# Patient Record
Sex: FEMALE | Race: WHITE
[De-identification: names, ages, dates, MRNs, and addresses within clinical notes are randomized per-mention and may not be internally consistent; named-entity substitution may affect disease eponyms.]

---

## 2018-09-14 ENCOUNTER — HOSPITAL ENCOUNTER (INPATIENT)
Dept: HOSPITAL 63 - ER | Age: 48
LOS: 3 days | Discharge: NURSING, REHAB OR LONG-TERM CARE | DRG: 871 | End: 2018-09-17
Attending: FAMILY MEDICINE | Admitting: FAMILY MEDICINE
Payer: SELF-PAY

## 2018-09-14 VITALS — DIASTOLIC BLOOD PRESSURE: 79 MMHG | SYSTOLIC BLOOD PRESSURE: 161 MMHG

## 2018-09-14 VITALS — WEIGHT: 293 LBS | BODY MASS INDEX: 50.02 KG/M2 | HEIGHT: 64 IN

## 2018-09-14 DIAGNOSIS — I10: ICD-10-CM

## 2018-09-14 DIAGNOSIS — Z83.3: ICD-10-CM
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
## 2018-09-14 DIAGNOSIS — E66.01: ICD-10-CM

## 2018-09-14 DIAGNOSIS — F41.9: ICD-10-CM

## 2018-09-14 DIAGNOSIS — Z91.19: ICD-10-CM

## 2018-09-14 DIAGNOSIS — N39.0: ICD-10-CM

## 2018-09-14 DIAGNOSIS — Z79.899: ICD-10-CM

## 2018-09-14 DIAGNOSIS — G93.40: ICD-10-CM

## 2018-09-14 DIAGNOSIS — F32.9: ICD-10-CM
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
## 2018-09-14 DIAGNOSIS — E44.0: ICD-10-CM

## 2018-09-14 DIAGNOSIS — Z87.440: ICD-10-CM

## 2018-09-14 DIAGNOSIS — I26.99: ICD-10-CM

## 2018-09-14 DIAGNOSIS — Z86.711: ICD-10-CM
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
## 2018-09-14 DIAGNOSIS — M19.90: ICD-10-CM

## 2018-09-14 DIAGNOSIS — E87.1: ICD-10-CM

## 2018-09-14 DIAGNOSIS — E86.0: ICD-10-CM

## 2018-09-14 DIAGNOSIS — A41.9: Primary | ICD-10-CM

## 2018-09-14 DIAGNOSIS — E11.10: ICD-10-CM

## 2018-09-14 DIAGNOSIS — G47.33: ICD-10-CM

## 2018-09-14 DIAGNOSIS — E87.6: ICD-10-CM

## 2018-09-14 DIAGNOSIS — F05: ICD-10-CM

## 2018-09-14 DIAGNOSIS — D50.9: ICD-10-CM

## 2018-09-14 DIAGNOSIS — Z86.73: ICD-10-CM

## 2018-09-14 DIAGNOSIS — Z60.2: ICD-10-CM

## 2018-09-14 LAB
% BANDS: 7 % (ref 0–9)
% LYMPHS: 4 % (ref 24–48)
% METAS: 1 % (ref 0–0)
% MONOS: 2 % (ref 0–10)
% SEGS: 86 % (ref 35–66)
AMPHETAMINE/METHAMPHETAMINE: (no result)
AMYLASE SERPL-CCNC: 45 U/L (ref 25–115)
ANION GAP SERPL CALC-SCNC: 14 MMOL/L (ref 6–14)
ANISOCYTOSIS BLD QL SMEAR: SLIGHT
APTT PPP: YELLOW S
BACTERIA #/AREA URNS HPF: (no result) /HPF
BARBITURATES UR-MCNC: (no result) UG/ML
BASOPHILS # BLD AUTO: 0.1 X10^3/UL (ref 0–0.2)
BASOPHILS NFR BLD: 1 % (ref 0–3)
BENZODIAZ UR-MCNC: (no result) UG/L
BGAS PCO2: 39 MMHG (ref 35–45)
BGAS PH: 7.36 (ref 7.35–7.45)
BGAS PO2: 64 MMHG (ref 80–100)
BILIRUB UR QL STRIP: (no result)
CA-I SERPL ISE-MCNC: 30 MG/DL (ref 7–20)
CALCIUM SERPL-MCNC: 8.8 MG/DL (ref 8.5–10.1)
CANNABINOIDS UR-MCNC: (no result) UG/L
CHLORIDE SERPL-SCNC: 95 MMOL/L (ref 98–107)
CO2 SERPL-SCNC: 24 MMOL/L (ref 21–32)
COCAINE UR-MCNC: (no result) NG/ML
CREAT SERPL-MCNC: 2.2 MG/DL (ref 0.6–1)
DELTA BASE BGAS: -4 MMOL/L (ref 0–3)
EOSINOPHIL NFR BLD: 0 % (ref 0–3)
EOSINOPHIL NFR BLD: 0 X10^3/UL (ref 0–0.7)
ERYTHROCYTE [DISTWIDTH] IN BLOOD BY AUTOMATED COUNT: 17.6 % (ref 11.5–14.5)
FIBRINOGEN PPP-MCNC: (no result) MG/DL
GFR SERPLBLD BASED ON 1.73 SQ M-ARVRAT: 23.9 ML/MIN
GLUCOSE SERPL-MCNC: 573 MG/DL (ref 70–99)
GLUCOSE UR STRIP-MCNC: >=1000 MG/DL
HCT VFR BLD CALC: 38.6 % (ref 36–47)
HGB BLD-MCNC: 12.3 G/DL (ref 12–15.5)
HYALINE CASTS #/AREA URNS LPF: (no result) /HPF
HYPOCHROMIA BLD QL SMEAR: SLIGHT
LYMPHOCYTES # BLD: 0.9 X10^3/UL (ref 1–4.8)
LYMPHOCYTES NFR BLD AUTO: 4 % (ref 24–48)
MAGNESIUM SERPL-MCNC: 1.9 MG/DL (ref 1.8–2.4)
MCH RBC QN AUTO: 25 PG (ref 25–35)
MCHC RBC AUTO-ENTMCNC: 32 G/DL (ref 31–37)
MCV RBC AUTO: 77 FL (ref 79–100)
METHADONE SERPL-MCNC: (no result) NG/ML
MICROCYTES BLD QL SMEAR: SLIGHT
MONO #: 0.5 X10^3/UL (ref 0–1.1)
MONOCYTES NFR BLD: 3 % (ref 0–9)
NEUT #: 18.5 X10^3UL (ref 1.8–7.7)
NEUTROPHILS NFR BLD AUTO: 92 % (ref 31–73)
NITRITE UR QL STRIP: (no result)
O2 SAT BGAS: 91 % (ref 92–99)
O2/TOTAL GAS SETTING VFR VENT: 21 %
OPIATES UR-MCNC: (no result) NG/ML
PCP SERPL-MCNC: (no result) MG/DL
PLATELET # BLD AUTO: 441 X10^3/UL (ref 140–400)
PLATELET # BLD EST: (no result) 10*3/UL
POTASSIUM SERPL-SCNC: 4.5 MMOL/L (ref 3.5–5.1)
RBC # BLD AUTO: 5.02 X10^6/UL (ref 3.5–5.4)
RBC #/AREA URNS HPF: (no result) /HPF (ref 0–2)
SODIUM SERPL-SCNC: 133 MMOL/L (ref 136–145)
SP GR UR STRIP: 1.01
SQUAMOUS #/AREA URNS LPF: (no result) /LPF
TOXIC GRANULES BLD QL SMEAR: PRESENT
UROBILINOGEN UR-MCNC: 0.2 MG/DL
WBC # BLD AUTO: 20 X10^3/UL (ref 4–11)
WBC #/AREA URNS HPF: (no result) /HPF (ref 0–4)

## 2018-09-14 PROCEDURE — 93005 ELECTROCARDIOGRAM TRACING: CPT

## 2018-09-14 PROCEDURE — 82803 BLOOD GASES ANY COMBINATION: CPT

## 2018-09-14 PROCEDURE — 87040 BLOOD CULTURE FOR BACTERIA: CPT

## 2018-09-14 PROCEDURE — 87086 URINE CULTURE/COLONY COUNT: CPT

## 2018-09-14 PROCEDURE — 83036 HEMOGLOBIN GLYCOSYLATED A1C: CPT

## 2018-09-14 PROCEDURE — 83735 ASSAY OF MAGNESIUM: CPT

## 2018-09-14 PROCEDURE — 80048 BASIC METABOLIC PNL TOTAL CA: CPT

## 2018-09-14 PROCEDURE — 81001 URINALYSIS AUTO W/SCOPE: CPT

## 2018-09-14 PROCEDURE — 96376 TX/PRO/DX INJ SAME DRUG ADON: CPT

## 2018-09-14 PROCEDURE — 85610 PROTHROMBIN TIME: CPT

## 2018-09-14 PROCEDURE — 87205 SMEAR GRAM STAIN: CPT

## 2018-09-14 PROCEDURE — 83880 ASSAY OF NATRIURETIC PEPTIDE: CPT

## 2018-09-14 PROCEDURE — 71275 CT ANGIOGRAPHY CHEST: CPT

## 2018-09-14 PROCEDURE — 80053 COMPREHEN METABOLIC PANEL: CPT

## 2018-09-14 PROCEDURE — 96366 THER/PROPH/DIAG IV INF ADDON: CPT

## 2018-09-14 PROCEDURE — 96365 THER/PROPH/DIAG IV INF INIT: CPT

## 2018-09-14 PROCEDURE — 85379 FIBRIN DEGRADATION QUANT: CPT

## 2018-09-14 PROCEDURE — 71045 X-RAY EXAM CHEST 1 VIEW: CPT

## 2018-09-14 PROCEDURE — 80061 LIPID PANEL: CPT

## 2018-09-14 PROCEDURE — 85730 THROMBOPLASTIN TIME PARTIAL: CPT

## 2018-09-14 PROCEDURE — 36600 WITHDRAWAL OF ARTERIAL BLOOD: CPT

## 2018-09-14 PROCEDURE — 76700 US EXAM ABDOM COMPLETE: CPT

## 2018-09-14 PROCEDURE — 73600 X-RAY EXAM OF ANKLE: CPT

## 2018-09-14 PROCEDURE — G0479 DRUG TEST PRESUMP NOT OPT: HCPCS

## 2018-09-14 PROCEDURE — 82550 ASSAY OF CK (CPK): CPT

## 2018-09-14 PROCEDURE — 73560 X-RAY EXAM OF KNEE 1 OR 2: CPT

## 2018-09-14 PROCEDURE — 36569 INSJ PICC 5 YR+ W/O IMAGING: CPT

## 2018-09-14 PROCEDURE — 96375 TX/PRO/DX INJ NEW DRUG ADDON: CPT

## 2018-09-14 PROCEDURE — 83540 ASSAY OF IRON: CPT

## 2018-09-14 PROCEDURE — 80076 HEPATIC FUNCTION PANEL: CPT

## 2018-09-14 PROCEDURE — 90756 CCIIV4 VACC ABX FREE IM: CPT

## 2018-09-14 PROCEDURE — 84484 ASSAY OF TROPONIN QUANT: CPT

## 2018-09-14 PROCEDURE — 80307 DRUG TEST PRSMV CHEM ANLYZR: CPT

## 2018-09-14 PROCEDURE — 83605 ASSAY OF LACTIC ACID: CPT

## 2018-09-14 PROCEDURE — 87641 MR-STAPH DNA AMP PROBE: CPT

## 2018-09-14 PROCEDURE — 93970 EXTREMITY STUDY: CPT

## 2018-09-14 PROCEDURE — 84443 ASSAY THYROID STIM HORMONE: CPT

## 2018-09-14 PROCEDURE — 94640 AIRWAY INHALATION TREATMENT: CPT

## 2018-09-14 PROCEDURE — 85007 BL SMEAR W/DIFF WBC COUNT: CPT

## 2018-09-14 PROCEDURE — 73590 X-RAY EXAM OF LOWER LEG: CPT

## 2018-09-14 PROCEDURE — 85025 COMPLETE CBC W/AUTO DIFF WBC: CPT

## 2018-09-14 PROCEDURE — 82140 ASSAY OF AMMONIA: CPT

## 2018-09-14 PROCEDURE — 82947 ASSAY GLUCOSE BLOOD QUANT: CPT

## 2018-09-14 PROCEDURE — 51702 INSERT TEMP BLADDER CATH: CPT

## 2018-09-14 PROCEDURE — 82150 ASSAY OF AMYLASE: CPT

## 2018-09-14 PROCEDURE — 36415 COLL VENOUS BLD VENIPUNCTURE: CPT

## 2018-09-14 PROCEDURE — 70450 CT HEAD/BRAIN W/O DYE: CPT

## 2018-09-14 PROCEDURE — 73620 X-RAY EXAM OF FOOT: CPT

## 2018-09-14 PROCEDURE — 99292 CRITICAL CARE ADDL 30 MIN: CPT

## 2018-09-14 PROCEDURE — 83550 IRON BINDING TEST: CPT

## 2018-09-14 PROCEDURE — 96368 THER/DIAG CONCURRENT INF: CPT

## 2018-09-14 SDOH — SOCIAL STABILITY - SOCIAL INSECURITY: PROBLEMS RELATED TO LIVING ALONE: Z60.2

## 2018-09-14 NOTE — RAD
PQRS Compliance statement:

 

One or more of the following individualized dose reduction techniques were

utilized for this examination:

1. Automated exposure control.

2. Adjustment of the mA and/or kV according to patient size.

3. Use of iterative reconstruction technique.

 

Indication:Mental status change

 

TECHNIQUE: CT head without IV contrast

 

COMPARISON:None

 

FINDINGS:

No pathologic extra-axial or intra-axial fluid collection. Bilateral basal

ganglia foci of low-attenuation is seen with compensatory dilation of the 

left lateral ventricle most likely prior lacunar infarcts. No acute 

intracranial bleed. No focal loss of gray-white differentiation. Orbits 

within normal limits. No acute calvarial fractures. Visualized paranasal 

sinuses and mastoid air cells are clear.

 

IMPRESSION:

Bilateral basal ganglia lacunar infarcts most likely chronic.  If concern 

for acute ischemic stroke is high, please consider MRI brain. 

 

Electronically signed by: Vadim Alvarado DO (9/14/2018 10:51 PM) [facility]

## 2018-09-14 NOTE — ED.ADGEN
Past History


Past Medical History:  Depression, Diabetes, UTI, Other





Adult General


Chief Complaint


Chief Complaint


".. I don't take my meds sometimes..."





HPI


HPI





Patient is a 47 year old female who presents with above hx and complaints of 

mental status change.   Pt. lives alone and has hx of medical non-compliance.  

Pt. found by family today very confused.     Pt. reportedly on floor more than 

4 hrs. per family  Initial glucose by paramedics was too high to read.   Pt. 

normally follows with Dr. Pereyra, and in past has followed with Dr. Pastor.   

Pt. has hx of prior episodes of non-compliance.   No hx of head trauma, ill 

contacts or travel.  Pt. did fall and was unable to get up.  Pt. is morbidly 

obese.   Pt. on arrival was still somewhat poor historian.  Pt. mental status 

improved with NS boluses.





Review of Systems


Review of Systems


Poor historian-  her only complaint was that we were not feeding her.


Constitutional: Denies fever or chills []


Eyes: Denies change in visual acuity, redness, or eye pain []


HENT: Denies nasal congestion or sore throat []


Respiratory: Denies cough or shortness of breath []


Cardiovascular: No additional information not addressed in HPI []


GI: Denies abdominal pain, nausea, vomiting, bloody stools or diarrhea []


: Denies dysuria or hematuria []


Musculoskeletal: Denies back pain or joint pain []


Integument: Denies rash or skin lesions []


Neurologic: Denies headache, focal weakness or sensory changes []


Endocrine: Denies polyuria or polydipsia []





All other systems were reviewed and found to be within normal limits, except as 

documented in this note.





Family History


Family History


DM





Current Medications


Current Medications





Current Medications








 Medications


  (Trade)  Dose


 Ordered  Sig/Jodee  Start Time


 Stop Time Status Last Admin


Dose Admin


 


 Insulin Human


 Regular


  (HumuLIN R VIAL)  10 unit  1X  ONCE  9/14/18 20:15


 9/14/18 20:16 DC 9/14/18 20:19


10 UNIT


 


 Insulin Human


 Regular 150 unit/


 Sodium Chloride  151.5 ml @ 


 0 mls/hr  1X  ONCE  9/14/18 21:15


 9/14/18 21:20 DC  





 


 Ondansetron HCl


  (Zofran)  4 mg  PRN Q4HRS  PRN  9/14/18 21:15


 9/15/18 21:14   





 


 Sodium Chloride  1,000 ml @ 


 1,000 mls/hr  1X  ONCE  9/14/18 21:00


 9/14/18 21:59 DC 9/14/18 21:50


1,000 MLS/HR





See Nursing for home meds.





Allergies


Allergies





Allergies








Coded Allergies Type Severity Reaction Last Updated Verified


 


  No Known Drug Allergies    9/14/18 No











Physical Exam


Physical Exam





Constitutional:Moderately acute distress,ill in appearance. []


HENT: Normocephalic, atraumatic, bilateral external ears normal, oropharynx dry

, no oral exudates, nose normal. []


Eyes: PERRLA, EOMI, conjunctiva normal, no discharge. [] 


Neck: Normal range of motion, no tenderness, supple, no stridor. Doses have 

basilar crackles.


Cardiovascular:Tachycardia Heart rate , regular rhythm, no murmur []PMI to Lt. 


Lungs & Thorax:  Bilateral breath sounds equal at apex on  auscultation []


Abdomen: Bowel sounds normal, soft, no tenderness, no masses, no pulsatile 

masses. [] Morbid obesity. Peritoneum and skin breakdown erythema


Skin: Warm, dry, no erythema, no rash. [] 


Back: No tenderness, no CVA tenderness. [] 


Extremities: No tenderness, no cyanosis, no clubbing, ROM intact, ankle  edema. 

[] 


Neurologic: Alert and oriented X2, very slow to answer,, moves ext. on request.

, decreased plantar sensory , no gross focal deficits . 


Psychologic: Affect flat,  judgement limited, mood depressed.





Current Patient Data


Lab Results





 Laboratory Tests








Test


 9/14/18


19:43 9/14/18


20:10


 


White Blood Count


 20.0 x10^3/uL


(4.0-11.0)  H 





 


Red Blood Count


 5.02 x10^6/uL


(3.50-5.40) 





 


Hemoglobin


 12.3 g/dL


(12.0-15.5) 





 


Hematocrit


 38.6 %


(36.0-47.0) 





 


Mean Corpuscular Volume


 77 fL ()


L 





 


Mean Corpuscular Hemoglobin 25 pg (25-35)   


 


Mean Corpuscular Hemoglobin


Concent 32 g/dL


(31-37) 





 


Red Cell Distribution Width


 17.6 %


(11.5-14.5)  H 





 


Platelet Count


 441 x10^3/uL


(140-400)  H 





 


Neutrophils (%) (Auto) 92 % (31-73)  H 


 


Lymphocytes (%) (Auto) 4 % (24-48)  L 


 


Monocytes (%) (Auto) 3 % (0-9)   


 


Eosinophils (%) (Auto) 0 % (0-3)   


 


Basophils (%) (Auto) 1 % (0-3)   


 


Neutrophils # (Auto)


 18.5 x10^3uL


(1.8-7.7)  H 





 


Lymphocytes # (Auto)


 0.9 x10^3/uL


(1.0-4.8)  L 





 


Monocytes # (Auto)


 0.5 x10^3/uL


(0.0-1.1) 





 


Eosinophils # (Auto)


 0.0 x10^3/uL


(0.0-0.7) 





 


Basophils # (Auto)


 0.1 x10^3/uL


(0.0-0.2) 





 


Segmented Neutrophils % 86 % (35-66)  H 


 


Band Neutrophils % 7 % (0-9)   


 


Lymphocytes % 4 % (24-48)  L 


 


Monocytes % 2 % (0-10)   


 


Metamyelocytes % 1 % (0-0)  H 


 


Toxic Granulation Present   


 


Platelet Estimate


 Increased


(ADEQUATE) 





 


Large Platelets Occ   


 


Giant Platelets Occ   


 


Hypochromasia Slight   


 


Anisocytosis Slight   


 


Microcytosis Slight   


 


Prothrombin Time


 9.8 SEC


(9.4-11.4) 





 


Prothrombin Time INR 1.0 (0.9-1.1)   


 


PTT


 21 SEC (23-33)


L 





 


Sodium Level


 133 mmol/L


(136-145)  L 





 


Potassium Level


 4.5 mmol/L


(3.5-5.1) 





 


Chloride Level


 95 mmol/L


()  L 





 


Carbon Dioxide Level


 24 mmol/L


(21-32) 





 


Anion Gap 14 (6-14)   


 


Blood Urea Nitrogen


 30 mg/dL


(7-20)  H 





 


Creatinine


 2.2 mg/dL


(0.6-1.0)  H 





 


Estimated GFR


(Cockcroft-Gault) 23.9  


 





 


Glucose Level


 573 mg/dL


(70-99)  *H 





 


Lactic Acid Level


 5.3 mmol/L


(0.4-2.0)  *H 





 


Calcium Level


 8.8 mg/dL


(8.5-10.1) 





 


Magnesium Level


 1.9 mg/dL


(1.8-2.4) 





 


Ammonia


 25 mcmol/L


(11-34) 





 


Creatine Kinase


 1436 U/L


()  H 





 


Troponin I Quantitative


 < 0.017 ng/mL


(0-0.055) 





 


NT-Pro-B-Type Natriuretic


Peptide 55 pg/mL


(0-124) 





 


Amylase Level


 45 U/L


() 





 


Blood pH


 


 7.36


(7.35-7.45)


 


Blood Gas PCO2


 


 39 mmHg


(35-45)


 


Blood Gas PO2


 


 64 mmHg


()  L


 


Blood Gas HCO3


 


 22 mmol/L


(22-26)


 


Arterial Bld O2 Saturation


(Calc) 


 91 % (92-99)  L





 


FiO2  21 %  











EKG


EKG


My interpretation EKG shows a sinus rhythm at 80 bpm. There is leftward axis. 

Findings of tea wave changes and ventricular hypertrophic. No findings acute 

STEMI of contralateral changes.[]





Radiology/Procedures


Radiology/Procedures


My interpretation of chest x-ray shows cardiomegaly. Some blunting a left 

closed phrenic angle. There is some flattening of the diaphragms. No free air 

under the diaphragm[]


My interpretation CT of head shows no shift, mass, edema, bleed, fracture. Does 

have atrophy. Does have findings white matter disease.





Course & Med Decision Making


Course & Med Decision Making


Pertinent Labs and Imaging studies reviewed. (See chart for details)





Discussed presentation, testing and tx. plan with Dr. Pereyra. -will admit 

for further eval. and tx. DKA





Critical care 90 min. 








[]





Final Impression


Final Impression


1. DKA


2. DM - glu. 573 after fluid bolus


3. Non-compliance with Medical Tx


4. Elevated Bun/Creat. -Dehydration


5. Hyponatremia- suspect artifact-2nd elev. Glucose


6. Elevated Lactic Acid 5.3


7. Elevated CK


8. Morbid Obesity


9. UTI


10. Mental Status Change


11. Leukocytosis= 20.


[]





Dragon Disclaimer


Dragon Disclaimer


This electronic medical record was generated, in whole or in part, using a 

voice recognition dictation system.











GENEVIEVE CESAR MD Sep 14, 2018 19:38

## 2018-09-14 NOTE — RAD
PROCEDURE: PORTABLE CHEST 1V

 

CLINICAL INDICATION: Mental status change, dyspnea

 

COMPARISON: None

 

FINDINGS:  

No pneumothorax identified. Cardiac and mediastinal contours unremarkable.

No pulmonary consolidation or acute airspace disease. No acute osseous 

abnormalities identified. 

 

IMPRESSION:

No pulmonary consolidation or acute airspace disease. 

 

 

 

Electronically signed by: Vadim Alvarado DO (9/14/2018 10:21 PM) [facility]

## 2018-09-14 NOTE — EKG
[facility] [address], [place] [address]

Test Date:    2018               Test Time:    20:31:51

Pat Name:     BHAVIN OBREGON          Department:   

Patient ID:   [id number]           Room:          

Gender:       F                        Technician:   

:          [id number]               Requested By: GENEVIEVE CESAR

Order Number: [id number]            Reading MD:   Laci Suggs

                                 Measurements

Intervals                              Axis          

Rate:         80                       P:            -31

AL:           178                      QRS:          -27

QRSD:         94                       T:            38

QT:           404                                    

QTc:          470                                    

                           Interpretive Statements

SINUS RHYTHM

LEFTWARD AXIS

CONSIDER LEFT VENTRICULAR HYPERTROPHY

T ABNORMALITY IN HIGH LATERAL LEADS

ABNORMAL ECG



Electronically Signed On 2018 11:10:57 CDT by Laci Suggs

## 2018-09-15 VITALS — SYSTOLIC BLOOD PRESSURE: 171 MMHG | DIASTOLIC BLOOD PRESSURE: 73 MMHG

## 2018-09-15 VITALS — DIASTOLIC BLOOD PRESSURE: 80 MMHG | SYSTOLIC BLOOD PRESSURE: 173 MMHG

## 2018-09-15 VITALS — DIASTOLIC BLOOD PRESSURE: 75 MMHG | SYSTOLIC BLOOD PRESSURE: 140 MMHG

## 2018-09-15 VITALS — SYSTOLIC BLOOD PRESSURE: 175 MMHG | DIASTOLIC BLOOD PRESSURE: 78 MMHG

## 2018-09-15 VITALS — DIASTOLIC BLOOD PRESSURE: 75 MMHG | SYSTOLIC BLOOD PRESSURE: 158 MMHG

## 2018-09-15 VITALS — DIASTOLIC BLOOD PRESSURE: 77 MMHG | SYSTOLIC BLOOD PRESSURE: 164 MMHG

## 2018-09-15 VITALS — DIASTOLIC BLOOD PRESSURE: 75 MMHG | SYSTOLIC BLOOD PRESSURE: 176 MMHG

## 2018-09-15 VITALS — SYSTOLIC BLOOD PRESSURE: 156 MMHG | DIASTOLIC BLOOD PRESSURE: 77 MMHG

## 2018-09-15 VITALS — DIASTOLIC BLOOD PRESSURE: 68 MMHG | SYSTOLIC BLOOD PRESSURE: 155 MMHG

## 2018-09-15 VITALS — SYSTOLIC BLOOD PRESSURE: 184 MMHG | DIASTOLIC BLOOD PRESSURE: 78 MMHG

## 2018-09-15 VITALS — SYSTOLIC BLOOD PRESSURE: 151 MMHG | DIASTOLIC BLOOD PRESSURE: 67 MMHG

## 2018-09-15 VITALS — DIASTOLIC BLOOD PRESSURE: 61 MMHG | SYSTOLIC BLOOD PRESSURE: 151 MMHG

## 2018-09-15 VITALS — SYSTOLIC BLOOD PRESSURE: 148 MMHG | DIASTOLIC BLOOD PRESSURE: 73 MMHG

## 2018-09-15 VITALS — DIASTOLIC BLOOD PRESSURE: 80 MMHG | SYSTOLIC BLOOD PRESSURE: 161 MMHG

## 2018-09-15 VITALS — DIASTOLIC BLOOD PRESSURE: 78 MMHG | SYSTOLIC BLOOD PRESSURE: 175 MMHG

## 2018-09-15 VITALS — DIASTOLIC BLOOD PRESSURE: 66 MMHG | SYSTOLIC BLOOD PRESSURE: 142 MMHG

## 2018-09-15 VITALS — DIASTOLIC BLOOD PRESSURE: 80 MMHG | SYSTOLIC BLOOD PRESSURE: 174 MMHG

## 2018-09-15 VITALS — SYSTOLIC BLOOD PRESSURE: 164 MMHG | DIASTOLIC BLOOD PRESSURE: 85 MMHG

## 2018-09-15 VITALS — DIASTOLIC BLOOD PRESSURE: 74 MMHG | SYSTOLIC BLOOD PRESSURE: 160 MMHG

## 2018-09-15 VITALS — DIASTOLIC BLOOD PRESSURE: 68 MMHG | SYSTOLIC BLOOD PRESSURE: 159 MMHG

## 2018-09-15 VITALS — SYSTOLIC BLOOD PRESSURE: 165 MMHG | DIASTOLIC BLOOD PRESSURE: 82 MMHG

## 2018-09-15 VITALS — SYSTOLIC BLOOD PRESSURE: 156 MMHG | DIASTOLIC BLOOD PRESSURE: 73 MMHG

## 2018-09-15 LAB
ALBUMIN SERPL-MCNC: 3.3 G/DL (ref 3.4–5)
ALP SERPL-CCNC: 108 U/L (ref 46–116)
ALT SERPL-CCNC: 178 U/L (ref 14–59)
ANION GAP SERPL CALC-SCNC: 11 MMOL/L (ref 6–14)
ANION GAP SERPL CALC-SCNC: 11 MMOL/L (ref 6–14)
ANION GAP SERPL CALC-SCNC: 8 MMOL/L (ref 6–14)
AST SERPL-CCNC: 368 U/L (ref 15–37)
BASOPHILS # BLD AUTO: 0.1 X10^3/UL (ref 0–0.2)
BASOPHILS NFR BLD: 0 % (ref 0–3)
BILIRUB DIRECT SERPL-MCNC: 0.1 MG/DL (ref 0–0.2)
BILIRUB SERPL-MCNC: 0.3 MG/DL (ref 0.2–1)
CA-I SERPL ISE-MCNC: 18 MG/DL (ref 7–20)
CA-I SERPL ISE-MCNC: 24 MG/DL (ref 7–20)
CA-I SERPL ISE-MCNC: 29 MG/DL (ref 7–20)
CALCIUM SERPL-MCNC: 8 MG/DL (ref 8.5–10.1)
CALCIUM SERPL-MCNC: 8.1 MG/DL (ref 8.5–10.1)
CALCIUM SERPL-MCNC: 8.8 MG/DL (ref 8.5–10.1)
CHLORIDE SERPL-SCNC: 100 MMOL/L (ref 98–107)
CHLORIDE SERPL-SCNC: 100 MMOL/L (ref 98–107)
CHLORIDE SERPL-SCNC: 99 MMOL/L (ref 98–107)
CHOLEST/HDLC SERPL: 7 {RATIO}
CO2 SERPL-SCNC: 25 MMOL/L (ref 21–32)
CO2 SERPL-SCNC: 26 MMOL/L (ref 21–32)
CO2 SERPL-SCNC: 29 MMOL/L (ref 21–32)
CREAT SERPL-MCNC: 1.2 MG/DL (ref 0.6–1)
CREAT SERPL-MCNC: 1.2 MG/DL (ref 0.6–1)
CREAT SERPL-MCNC: 1.8 MG/DL (ref 0.6–1)
EOSINOPHIL NFR BLD: 0 % (ref 0–3)
EOSINOPHIL NFR BLD: 0 X10^3/UL (ref 0–0.7)
ERYTHROCYTE [DISTWIDTH] IN BLOOD BY AUTOMATED COUNT: 17.6 % (ref 11.5–14.5)
GFR SERPLBLD BASED ON 1.73 SQ M-ARVRAT: 30.2 ML/MIN
GFR SERPLBLD BASED ON 1.73 SQ M-ARVRAT: 48.2 ML/MIN
GFR SERPLBLD BASED ON 1.73 SQ M-ARVRAT: 48.2 ML/MIN
GLUCOSE SERPL-MCNC: 169 MG/DL (ref 70–99)
GLUCOSE SERPL-MCNC: 250 MG/DL (ref 70–99)
GLUCOSE SERPL-MCNC: 255 MG/DL (ref 70–99)
HBA1C MFR BLD: 8 % (ref 4.8–5.6)
HCT VFR BLD CALC: 35.9 % (ref 36–47)
HDLC SERPL-MCNC: 37 MG/DL (ref 40–60)
HGB BLD-MCNC: 11.2 G/DL (ref 12–15.5)
LDLC: 179 MG/DL (ref 0–100)
LYMPHOCYTES # BLD: 2.2 X10^3/UL (ref 1–4.8)
LYMPHOCYTES NFR BLD AUTO: 13 % (ref 24–48)
MCH RBC QN AUTO: 24 PG (ref 25–35)
MCHC RBC AUTO-ENTMCNC: 31 G/DL (ref 31–37)
MCV RBC AUTO: 77 FL (ref 79–100)
MONO #: 0.8 X10^3/UL (ref 0–1.1)
MONOCYTES NFR BLD: 5 % (ref 0–9)
NEUT #: 13.9 X10^3UL (ref 1.8–7.7)
NEUTROPHILS NFR BLD AUTO: 82 % (ref 31–73)
PLATELET # BLD AUTO: 341 X10^3/UL (ref 140–400)
POTASSIUM SERPL-SCNC: 2.7 MMOL/L (ref 3.5–5.1)
POTASSIUM SERPL-SCNC: 3.2 MMOL/L (ref 3.5–5.1)
POTASSIUM SERPL-SCNC: 3.4 MMOL/L (ref 3.5–5.1)
PROT SERPL-MCNC: 6.6 G/DL (ref 6.4–8.2)
RBC # BLD AUTO: 4.67 X10^6/UL (ref 3.5–5.4)
SODIUM SERPL-SCNC: 136 MMOL/L (ref 136–145)
SODIUM SERPL-SCNC: 136 MMOL/L (ref 136–145)
SODIUM SERPL-SCNC: 137 MMOL/L (ref 136–145)
TRIGL SERPL-MCNC: 297 MG/DL (ref 0–150)
VLDLC: 59 MG/DL (ref 0–40)
WBC # BLD AUTO: 17 X10^3/UL (ref 4–11)

## 2018-09-15 RX ADMIN — POTASSIUM CHLORIDE SCH MEQ: 1500 TABLET, EXTENDED RELEASE ORAL at 09:16

## 2018-09-15 RX ADMIN — INSULIN LISPRO SCH UNITS: 100 INJECTION, SOLUTION INTRAVENOUS; SUBCUTANEOUS at 12:00

## 2018-09-15 RX ADMIN — DEXTROSE AND SODIUM CHLORIDE SCH MLS/HR: 5; .45 INJECTION, SOLUTION INTRAVENOUS at 16:44

## 2018-09-15 RX ADMIN — LOSARTAN POTASSIUM SCH MG: 50 TABLET, FILM COATED ORAL at 20:35

## 2018-09-15 RX ADMIN — INSULIN LISPRO SCH UNITS: 100 INJECTION, SOLUTION INTRAVENOUS; SUBCUTANEOUS at 18:14

## 2018-09-15 RX ADMIN — DEXTROSE AND SODIUM CHLORIDE SCH MLS/HR: 5; .45 INJECTION, SOLUTION INTRAVENOUS at 01:11

## 2018-09-15 RX ADMIN — Medication SCH CAP: at 09:16

## 2018-09-15 RX ADMIN — POTASSIUM CHLORIDE SCH MEQ: 1500 TABLET, EXTENDED RELEASE ORAL at 07:35

## 2018-09-15 RX ADMIN — LOSARTAN POTASSIUM SCH MG: 50 TABLET, FILM COATED ORAL at 12:19

## 2018-09-15 RX ADMIN — Medication SCH CAP: at 20:35

## 2018-09-15 NOTE — RAD
Left knee, left tibia and fibula, left foot and left ankle radiograph

 

 9/15/2018 5:13 PM

 

INDICATION:  Pain in the left lower extremity

COMPARISON: None available.

 

TECHNIQUE:  2 views of the left tibia and fibula, 2 views of the left 

knee, 2 views of the left foot and 2 views left ankle are provided.

 

FINDINGS:

 

There is advanced medial femorotibial joint space narrowing with 

tricompartmental marginal osteophytosis. There is advanced patellofemoral 

joint space narrowing with marginal osteophytosis. No significant knee 

joint effusion. Posterior and plantar calcaneal enthesophytes are 

visualized. The ankle mortise is congruent. Tibial plafond and talar dome 

are intact.

 

There is no acute fracture or dislocation. Bone mineralization is within 

normal limits. Diffuse subcutaneous edema noted. There is no soft tissue 

gas or osseous erosion.

 

IMPRESSION:

 

No acute fracture or dislocation involving the knee, tibia and fibula, 

ankle and foot.

 

Advanced osteoarthrosis of the left knee.

 

Electronically signed by: Jayne Valerio MD (9/15/2018 6:18 PM) 

[facility]

## 2018-09-15 NOTE — RAD
Left knee, left tibia and fibula, left foot and left ankle radiograph

 

 9/15/2018 5:13 PM

 

INDICATION:  Pain in the left lower extremity

COMPARISON: None available.

 

TECHNIQUE:  2 views of the left tibia and fibula, 2 views of the left 

knee, 2 views of the left foot and 2 views left ankle are provided.

 

FINDINGS:

 

There is advanced medial femorotibial joint space narrowing with 

tricompartmental marginal osteophytosis. There is advanced patellofemoral 

joint space narrowing with marginal osteophytosis. No significant knee 

joint effusion. Posterior and plantar calcaneal enthesophytes are 

visualized. The ankle mortise is congruent. Tibial plafond and talar dome 

are intact.

 

There is no acute fracture or dislocation. Bone mineralization is within 

normal limits. Diffuse subcutaneous edema noted. There is no soft tissue 

gas or osseous erosion.

 

IMPRESSION:

 

No acute fracture or dislocation involving the knee, tibia and fibula, 

ankle and foot.

 

Advanced osteoarthrosis of the left knee.

 

Electronically signed by: Jayne Valerio MD (9/15/2018 6:18 PM) 

[facility]

## 2018-09-15 NOTE — HP
ADMIT DATE:  09/14/2018



HISTORY OF PRESENT ILLNESS:  A 47-year-old female came in through the Emergency

Room, apparently he was found down the floor of her home.  The patient notes she

had some change in mental status.  She was markedly confused, disoriented,

probably been down for about 4 hours.  Blood sugars were markedly elevated and

the patient was noted to be in DKA as well as having elevated lactic acid.  The

patient's blood sugar was approximately close to 600.  Lactic acid 5.3.  She was

admitted for sepsis and DKA.  She was given protocol in the Emergency Room and

did much better.



PAST MEDICAL HISTORY:  Includes that of hypertension, sleep apnea, severe type 2

diabetes with morbid obesity, and hernia repair as well.



HOME MEDICATIONS:  Include diphenhydramine, Invokana _____, and other medication

that identified, she also had some blood pressure medications as well.



ALLERGIES:  In any case, the patient has no known drug allergies.



SOCIAL HISTORY:  Denies smoking, alcohol or drug use.



FAMILY HISTORY:  Basically unremarkable and noncontributory.



REVIEW OF SYSTEMS:  The patient also has some pain in her left lower leg.  She

denies any headaches, vision changes, blurred vision, or double vision.  Denies

any nausea, vomiting or abdominal pain.  Chest pain, negative shortness of

breath.  The patient did have an elevated D-dimer.



PHYSICAL EXAMINATION:

GENERAL:  A pleasant white female, morbidly obese.

VITAL SIGNS:  Blood pressure 180/80, respiratory rate 17, pulse 80, afebrile.

HEENT:  The patient's head was atraumatic, normocephalic.  Eyes:  PERRLA without

jaundice.  Mouth and throat were normal.

NECK:  Supple, no JVD or thyromegaly.

LUNGS:  Diminished throughout, poor movement of air, but clear.

CARDIOVASCULAR:  Regular sinus rhythm.

ABDOMEN:  Protuberant, soft, and nontender.

EXTREMITIES:  No clubbing, cyanosis or edema.

NEUROLOGIC:  The patient is alert and oriented x 3, at the present time.  _____

when she came in; however, the patient was admitted for further evaluation.



IMPRESSION AND PLAN:  Diabetic ketoacidosis, sepsis, morbid obesity,

hypokalemia.  The patient's potassium was 2.7.  Electrolyte replacement

performed.  Otherwise, we will get her blood sugars under control, fluids,

antibiotics monitored here in the ICU for her critical condition.





______________________________

SUSIE KERR MD



DR:  ROHIT/melisa  JOB#:  [id number] / [id number]

DD:  09/15/2018 16:37  DT:  09/15/2018 17:46

## 2018-09-15 NOTE — RAD
Left knee, left tibia and fibula, left foot and left ankle radiograph

 

 9/15/2018 5:13 PM

 

INDICATION:  Pain in the left lower extremity

COMPARISON: None available.

 

TECHNIQUE:  2 views of the left tibia and fibula, 2 views of the left 

knee, 2 views of the left foot and 2 views left ankle are provided.

 

FINDINGS:

 

There is advanced medial femorotibial joint space narrowing with 

tricompartmental marginal osteophytosis. There is advanced patellofemoral 

joint space narrowing with marginal osteophytosis. No significant knee 

joint effusion. Posterior and plantar calcaneal enthesophytes are 

visualized. The ankle mortise is congruent. Tibial plafond and talar dome 

are intact.

 

There is no acute fracture or dislocation. Bone mineralization is within 

normal limits. Diffuse subcutaneous edema noted. There is no soft tissue 

gas or osseous erosion.

 

IMPRESSION:

 

No acute fracture or dislocation involving the knee, tibia and fibula, 

ankle and foot.

 

Advanced osteoarthrosis of the left knee.

 

Electronically signed by: Jayne Valerio MD (9/15/2018 6:18 PM) 

[facility]

## 2018-09-15 NOTE — RAD
Left knee, left tibia and fibula, left foot and left ankle radiograph

 

 9/15/2018 5:13 PM

 

INDICATION:  Pain in the left lower extremity

COMPARISON: None available.

 

TECHNIQUE:  2 views of the left tibia and fibula, 2 views of the left 

knee, 2 views of the left foot and 2 views left ankle are provided.

 

FINDINGS:

 

There is advanced medial femorotibial joint space narrowing with 

tricompartmental marginal osteophytosis. There is advanced patellofemoral 

joint space narrowing with marginal osteophytosis. No significant knee 

joint effusion. Posterior and plantar calcaneal enthesophytes are 

visualized. The ankle mortise is congruent. Tibial plafond and talar dome 

are intact.

 

There is no acute fracture or dislocation. Bone mineralization is within 

normal limits. Diffuse subcutaneous edema noted. There is no soft tissue 

gas or osseous erosion.

 

IMPRESSION:

 

No acute fracture or dislocation involving the knee, tibia and fibula, 

ankle and foot.

 

Advanced osteoarthrosis of the left knee.

 

Electronically signed by: Jayne Valerio MD (9/15/2018 6:18 PM) 

[facility]

## 2018-09-16 VITALS — SYSTOLIC BLOOD PRESSURE: 84 MMHG | DIASTOLIC BLOOD PRESSURE: 55 MMHG

## 2018-09-16 VITALS — SYSTOLIC BLOOD PRESSURE: 138 MMHG | DIASTOLIC BLOOD PRESSURE: 81 MMHG

## 2018-09-16 VITALS — DIASTOLIC BLOOD PRESSURE: 72 MMHG | SYSTOLIC BLOOD PRESSURE: 165 MMHG

## 2018-09-16 VITALS — DIASTOLIC BLOOD PRESSURE: 75 MMHG | SYSTOLIC BLOOD PRESSURE: 133 MMHG

## 2018-09-16 VITALS — SYSTOLIC BLOOD PRESSURE: 173 MMHG | DIASTOLIC BLOOD PRESSURE: 74 MMHG

## 2018-09-16 VITALS — DIASTOLIC BLOOD PRESSURE: 66 MMHG | SYSTOLIC BLOOD PRESSURE: 156 MMHG

## 2018-09-16 VITALS — SYSTOLIC BLOOD PRESSURE: 180 MMHG | DIASTOLIC BLOOD PRESSURE: 99 MMHG

## 2018-09-16 VITALS — SYSTOLIC BLOOD PRESSURE: 143 MMHG | DIASTOLIC BLOOD PRESSURE: 78 MMHG

## 2018-09-16 VITALS — SYSTOLIC BLOOD PRESSURE: 159 MMHG | DIASTOLIC BLOOD PRESSURE: 76 MMHG

## 2018-09-16 VITALS — DIASTOLIC BLOOD PRESSURE: 72 MMHG | SYSTOLIC BLOOD PRESSURE: 159 MMHG

## 2018-09-16 VITALS — SYSTOLIC BLOOD PRESSURE: 185 MMHG | DIASTOLIC BLOOD PRESSURE: 88 MMHG

## 2018-09-16 VITALS — DIASTOLIC BLOOD PRESSURE: 81 MMHG | SYSTOLIC BLOOD PRESSURE: 149 MMHG

## 2018-09-16 VITALS — DIASTOLIC BLOOD PRESSURE: 59 MMHG | SYSTOLIC BLOOD PRESSURE: 137 MMHG

## 2018-09-16 VITALS — SYSTOLIC BLOOD PRESSURE: 171 MMHG | DIASTOLIC BLOOD PRESSURE: 81 MMHG

## 2018-09-16 LAB
ALBUMIN SERPL-MCNC: 3.1 G/DL (ref 3.4–5)
ALBUMIN/GLOB SERPL: 1 {RATIO} (ref 1–1.7)
ALP SERPL-CCNC: 101 U/L (ref 46–116)
ALT SERPL-CCNC: 171 U/L (ref 14–59)
ANION GAP SERPL CALC-SCNC: 3 MMOL/L (ref 6–14)
AST SERPL-CCNC: 232 U/L (ref 15–37)
BASOPHILS # BLD AUTO: 0.1 X10^3/UL (ref 0–0.2)
BASOPHILS NFR BLD: 1 % (ref 0–3)
BILIRUB SERPL-MCNC: 0.4 MG/DL (ref 0.2–1)
BUN/CREAT SERPL: 11 (ref 6–20)
CA-I SERPL ISE-MCNC: 10 MG/DL (ref 7–20)
CALCIUM SERPL-MCNC: 8 MG/DL (ref 8.5–10.1)
CHLORIDE SERPL-SCNC: 99 MMOL/L (ref 98–107)
CO2 SERPL-SCNC: 33 MMOL/L (ref 21–32)
CREAT SERPL-MCNC: 0.9 MG/DL (ref 0.6–1)
EOSINOPHIL NFR BLD: 0.1 X10^3/UL (ref 0–0.7)
EOSINOPHIL NFR BLD: 1 % (ref 0–3)
ERYTHROCYTE [DISTWIDTH] IN BLOOD BY AUTOMATED COUNT: 18.3 % (ref 11.5–14.5)
GFR SERPLBLD BASED ON 1.73 SQ M-ARVRAT: 67.1 ML/MIN
GLOBULIN SER-MCNC: 3.2 G/DL (ref 2.2–3.8)
GLUCOSE SERPL-MCNC: 225 MG/DL (ref 70–99)
HCT VFR BLD CALC: 32.3 % (ref 36–47)
HGB BLD-MCNC: 10.6 G/DL (ref 12–15.5)
LYMPHOCYTES # BLD: 1.8 X10^3/UL (ref 1–4.8)
LYMPHOCYTES NFR BLD AUTO: 16 % (ref 24–48)
MCH RBC QN AUTO: 25 PG (ref 25–35)
MCHC RBC AUTO-ENTMCNC: 33 G/DL (ref 31–37)
MCV RBC AUTO: 75 FL (ref 79–100)
MONO #: 0.7 X10^3/UL (ref 0–1.1)
MONOCYTES NFR BLD: 6 % (ref 0–9)
NEUT #: 8.6 X10^3UL (ref 1.8–7.7)
NEUTROPHILS NFR BLD AUTO: 76 % (ref 31–73)
PLATELET # BLD AUTO: 230 X10^3/UL (ref 140–400)
POTASSIUM SERPL-SCNC: 3.3 MMOL/L (ref 3.5–5.1)
PROT SERPL-MCNC: 6.3 G/DL (ref 6.4–8.2)
RBC # BLD AUTO: 4.29 X10^6/UL (ref 3.5–5.4)
SODIUM SERPL-SCNC: 135 MMOL/L (ref 136–145)
WBC # BLD AUTO: 11.3 X10^3/UL (ref 4–11)

## 2018-09-16 RX ADMIN — LOSARTAN POTASSIUM SCH MG: 50 TABLET, FILM COATED ORAL at 07:32

## 2018-09-16 RX ADMIN — DEXTROSE AND SODIUM CHLORIDE SCH MLS/HR: 5; .45 INJECTION, SOLUTION INTRAVENOUS at 01:21

## 2018-09-16 RX ADMIN — Medication SCH CAP: at 09:42

## 2018-09-16 RX ADMIN — INSULIN LISPRO SCH UNITS: 100 INJECTION, SOLUTION INTRAVENOUS; SUBCUTANEOUS at 19:58

## 2018-09-16 RX ADMIN — LIDOCAINE SCH PATCH: 50 PATCH CUTANEOUS at 09:42

## 2018-09-16 RX ADMIN — INSULIN LISPRO SCH UNITS: 100 INJECTION, SOLUTION INTRAVENOUS; SUBCUTANEOUS at 08:11

## 2018-09-16 RX ADMIN — Medication SCH CAP: at 19:57

## 2018-09-16 RX ADMIN — METOPROLOL TARTRATE SCH MG: 25 TABLET, FILM COATED ORAL at 19:56

## 2018-09-16 RX ADMIN — INSULIN LISPRO SCH UNITS: 100 INJECTION, SOLUTION INTRAVENOUS; SUBCUTANEOUS at 17:03

## 2018-09-16 RX ADMIN — LOSARTAN POTASSIUM SCH MG: 50 TABLET, FILM COATED ORAL at 19:57

## 2018-09-16 RX ADMIN — INSULIN LISPRO SCH UNITS: 100 INJECTION, SOLUTION INTRAVENOUS; SUBCUTANEOUS at 12:00

## 2018-09-16 RX ADMIN — ENOXAPARIN SODIUM SCH MG: 150 INJECTION SUBCUTANEOUS at 19:56

## 2018-09-16 NOTE — RAD
CTA of the chest with contrast, 9/16/2018:

 

HISTORY: Shortness of breath

 

Multidetector CT imaging was performed following an IV bolus injection of 

iodinated contrast material. Multiplanar reconstructions were produced 

including coronal MIP images.

 

There are several filling defects in segmental and subsegmental pulmonary 

arteries in the right lower lobe. Filling defects are also present in the 

inferior aspect of the left lower lobe pulmonary artery extending into 

segmental branches. There are pulsation artifacts related to the main 

pulmonary artery. A small amount of mural thrombus laterally cannot be 

entirely excluded. No definite upper lobe or right middle lobe pulmonary 

emboli are seen.

 

The thoracic aorta is of normal caliber. The heart is mildly enlarged. 

There appears to be a left PICC type line in place extending into the 

superior vena cava. No mediastinal adenopathy is evident.

 

There is mild bibasilar linear atelectasis and/or scarring. No significant

pleural fluid is present.

 

Moderate multilevel degenerative changes are present in the spine.

 

IMPRESSION: Bilateral pulmonary emboli.

 

 

 

Note: The findings were called to the patient's nurse in the ICU at 11:11 

AM on 9/16/2018.

 

 

PQRS Compliance Statement:

 

One or more of the following individualized dose reduction techniques were

utilized for this examination:

1. Automated exposure control

2. Adjustment of the mA and/or kV according to patient size

3. Use of iterative reconstruction technique

 

Electronically signed by: Rick Moritz, MD (9/16/2018 11:12 AM) [facility]

## 2018-09-16 NOTE — PDOC
Exam


Note:


Sebsatian Note:


Please also refer to the separate dictated note~for this date of service 

dictated separately.~Patient seen individually. Discussed the patient with 

Nursing staff reviewed the chart.~Reviewed interim history and current 

functioning. Reviewed vital signs,~Labs/ Radiology~and current medications 

noted below. Continue current treatment with the changes noted in the dictated 

addendum note





Assessment:


Vital Signs:





 Vital Signs








  Date Time  Temp Pulse Resp B/P (MAP) Pulse Ox O2 Delivery O2 Flow Rate FiO2


 


9/16/18 18:04 98.4 71 20 149/81 (103) 97 Nasal Cannula 1.0 








I&O











Intake and Output 


 


 9/16/18





 07:00


 


Intake Total 1583 ml


 


Output Total 4850 ml


 


Balance -3267 ml


 


 


 


Intake Oral 1550 ml


 


IV Total 33 ml


 


Output Urine Total 4850 ml








Labs:





 Laboratory Tests








Test


 9/15/18


20:40 9/16/18


05:45 9/16/18


07:31 9/16/18


10:00


 


Glucose (Fingerstick)


 252 mg/dL


(70-99)  H 


 236 mg/dL


(70-99)  H 





 


White Blood Count


 


 11.3 x10^3/uL


(4.0-11.0)  H 


 





 


Red Blood Count


 


 4.29 x10^6/uL


(3.50-5.40) 


 





 


Hemoglobin


 


 10.6 g/dL


(12.0-15.5)  L 


 





 


Hematocrit


 


 32.3 %


(36.0-47.0)  L 


 





 


Mean Corpuscular Volume


 


 75 fL ()


L 


 





 


Mean Corpuscular Hemoglobin  25 pg (25-35)    


 


Mean Corpuscular Hemoglobin


Concent 


 33 g/dL


(31-37) 


 





 


Red Cell Distribution Width


 


 18.3 %


(11.5-14.5)  H 


 





 


Platelet Count


 


 230 x10^3/uL


(140-400) 


 





 


Neutrophils (%) (Auto)  76 % (31-73)  H  


 


Lymphocytes (%) (Auto)  16 % (24-48)  L  


 


Monocytes (%) (Auto)  6 % (0-9)    


 


Eosinophils (%) (Auto)  1 % (0-3)    


 


Basophils (%) (Auto)  1 % (0-3)    


 


Neutrophils # (Auto)


 


 8.6 x10^3uL


(1.8-7.7)  H 


 





 


Lymphocytes # (Auto)


 


 1.8 x10^3/uL


(1.0-4.8) 


 





 


Monocytes # (Auto)


 


 0.7 x10^3/uL


(0.0-1.1) 


 





 


Eosinophils # (Auto)


 


 0.1 x10^3/uL


(0.0-0.7) 


 





 


Basophils # (Auto)


 


 0.1 x10^3/uL


(0.0-0.2) 


 





 


Sodium Level


 


 135 mmol/L


(136-145)  L 


 





 


Potassium Level


 


 3.3 mmol/L


(3.5-5.1)  L 


 





 


Chloride Level


 


 99 mmol/L


() 


 





 


Carbon Dioxide Level


 


 33 mmol/L


(21-32)  H 


 





 


Anion Gap  3 (6-14)  L  


 


Blood Urea Nitrogen


 


 10 mg/dL


(7-20) 


 





 


Creatinine


 


 0.9 mg/dL


(0.6-1.0) 


 





 


Estimated GFR


(Cockcroft-Gault) 


 67.1  


 


 





 


BUN/Creatinine Ratio  11 (6-20)    


 


Glucose Level


 


 225 mg/dL


(70-99)  H 


 





 


Calcium Level


 


 8.0 mg/dL


(8.5-10.1)  L 


 





 


Total Bilirubin


 


 0.4 mg/dL


(0.2-1.0) 


 





 


Aspartate Amino Transferase


(AST) 


 232 U/L


(15-37)  H 


 





 


Alanine Aminotransferase (ALT)


 


 171 U/L


(14-59)  H 


 





 


Alkaline Phosphatase


 


 101 U/L


() 


 





 


Total Protein


 


 6.3 g/dL


(6.4-8.2)  L 


 





 


Albumin


 


 3.1 g/dL


(3.4-5.0)  L 


 





 


Albumin/Globulin Ratio  1.0 (1.0-1.7)    


 


Thyroid Stimulating Hormone


(TSH) 


 2.931 uIU/mL


(0.358-3.740) 


 





 


Iron Level


 


 


 


 43 ug/dL


()  L


 


Total Iron Binding Capacity


 


 


 


 341 ug/dL


(250-450)


 


Iron Saturation    13 % (15-34)  L


 


Test


 9/16/18


11:27 9/16/18


16:51 


 





 


Glucose (Fingerstick)


 298 mg/dL


(70-99)  H 249 mg/dL


(70-99)  H 


 














Current Medications:


Meds:





Current Medications


Insulin Human Regular 150 unit/ Sodium Chloride 151.5 ml @  0 mls/hr 1X  ONCE 

IV  Last administered on 9/14/18at 20:58;  Start 9/14/18 at 20:15;  Stop 9/14/ 18 at 20:16;  Status DC


Insulin Human Regular (HumuLIN R VIAL) 10 unit 1X  ONCE IV  Last administered 

on 9/14/18at 20:19;  Start 9/14/18 at 20:15;  Stop 9/14/18 at 20:16;  Status DC


Sodium Chloride 1,000 ml @  1,000 mls/hr 1X  ONCE IV  Last administered on 9/14/ 18at 20:09;  Start 9/14/18 at 20:15;  Stop 9/14/18 at 21:14;  Status DC


Sodium Chloride 150 ml @  As Directed STK-MED ONCE .ROUTE ;  Start 9/14/18 at 20

:09;  Stop 9/14/18 at 20:10;  Status DC


Sodium Chloride 1,000 ml @  1,000 mls/hr 1X  ONCE IV  Last administered on 9/14/ 18at 21:50;  Start 9/14/18 at 21:00;  Stop 9/14/18 at 21:59;  Status DC


Ondansetron HCl (Zofran) 4 mg PRN Q4HRS  PRN IV NAUSEA/VOMITING;  Start 9/14/18 

at 21:15;  Stop 9/15/18 at 21:14;  Status DC


Albuterol/ Ipratropium (Duoneb) 3 ml RTQID NEB  Last administered on 9/15/18at 

05:41;  Start 9/15/18 at 08:00;  Stop 9/15/18 at 10:33;  Status DC


Insulin Human Regular 150 unit/ Sodium Chloride 151.5 ml @  0 mls/hr 1X  ONCE 

IV ;  Start 9/14/18 at 21:15;  Stop 9/14/18 at 21:20;  Status DC


Levofloxacin (Levaquin) 500 mg QHS PO  Last administered on 9/15/18at 20:35;  

Start 9/14/18 at 22:00


Ceftriaxone Sodium 1 gm/ Sodium Chloride 50 ml @  100 mls/hr 1X  ONCE IV  Last 

administered on 9/14/18at 22:02;  Start 9/14/18 at 22:15;  Stop 9/14/18 at 22:44

;  Status DC


Ceftriaxone Sodium 1 gm/ Sodium Chloride 50 ml @  100 mls/hr QHS IV  Last 

administered on 9/15/18at 20:35;  Start 9/15/18 at 21:00


Sodium Chloride 50 ml @ As Directed STK-MED ONCE .ROUTE ;  Start 9/14/18 at 21:

55;  Stop 9/14/18 at 21:56;  Status DC


Ceftriaxone Sodium (Rocephin) 1 gm STK-MED ONCE IV ;  Start 9/14/18 at 21:55;  

Stop 9/14/18 at 21:56;  Status DC


Sodium Bicarbonate (Sodium Bicarb Adult 8.4% Syr) 50 meq 1X  ONCE IV  Last 

administered on 9/14/18at 23:01;  Start 9/14/18 at 22:30;  Stop 9/14/18 at 22:32

;  Status DC


Sodium Chloride 1,000 ml @  0 mls/hr Q0M IV ;  Start 9/14/18 at 23:47


Dextrose/Sodium Chloride 1,000 ml @  0 mls/hr Q0M IV  Last administered on 9/16/ 18at 01:21;  Start 9/14/18 at 23:47


Insulin Human Regular 150 unit/ Sodium Chloride 151.5 ml @  0 mls/hr CONT PRN  

PRN IV PER PROTOCOL;  Start 9/15/18 at 00:00;  Stop 9/16/18 at 09:40;  Status DC


Influenza Virus Vaccine (Afluria Trivalent 6599-8986 Syringe) 0.5 ml ONCE ONCE 

VAX IM ;  Start 9/17/18 at 09:00;  Stop 9/17/18 at 09:01


Potassium Chloride 100 ml @  50 mls/hr 1X  ONCE IV ;  Start 9/15/18 at 03:00;  

Stop 9/15/18 at 04:59;  Status UNV


Potassium Chloride/Dextrose/ Sod Cl 1,000 ml @  250 mls/hr Q4H IV  Last 

administered on 9/15/18at 03:04;  Start 9/15/18 at 03:00;  Stop 9/15/18 at 06:59

;  Status DC


Potassium Chloride (Klor-Con) 40 meq Q2H PO  Last administered on 9/15/18at 09:

16;  Start 9/15/18 at 07:30;  Stop 9/15/18 at 09:31;  Status DC


Lactobacillus Rhamnosus (Culturelle) 1 cap BID PO  Last administered on 9/16/ 18at 09:42;  Start 9/15/18 at 09:00


Info (Non-Icu Electrolyte Protocol) 1 ea CONT PRN  PRN MC PER PROTOCOL;  Start 9

/15/18 at 08:30


Losartan Potassium (Cozaar) 50 mg BID PO  Last administered on 9/16/18at 07:32;

  Start 9/15/18 at 12:00


Insulin Human Lispro (HumaLOG) 0-7 UNITS TIDWMEALS SQ  Last administered on 9/16 /18at 17:03;  Start 9/15/18 at 12:00


Dextrose 12.5 gm PRN Q15MIN  PRN IV SEE COMMENTS;  Start 9/15/18 at 12:00


Insulin Human Lispro (HumaLOG) 4 units 1X  ONCE SQ  Last administered on 9/15/

18at 13:44;  Start 9/15/18 at 13:30;  Stop 9/15/18 at 13:31;  Status DC


Potassium Chloride (Klor-Con) 40 meq 1X  ONCE PO  Last administered on 9/15/

18at 15:45;  Start 9/15/18 at 15:45;  Stop 9/15/18 at 15:46;  Status DC


Iohexol (Omnipaque 300 Mg/ml) 75 ml 1X  ONCE IV  Last administered on 9/16/18at 

10:09;  Start 9/15/18 at 16:30;  Stop 9/15/18 at 16:31;  Status DC


Diphenhydramine HCl (Benadryl) 50 mg PRN QHS  PRN PO INSOMNIA;  Start 9/15/18 

at 16:45


Non-Formulary Medication (Canagliflozin/ Metformin HCl (Invokamet  mg 

Tablet)) 1 each DAILY PO ;  Start 9/16/18 at 09:00;  Stop 9/16/18 at 10:35;  

Status DC


Potassium Chloride (Klor-Con) 40 meq 1X  ONCE PO  Last administered on 9/16/ 18at 07:29;  Start 9/16/18 at 07:00;  Stop 9/16/18 at 07:01;  Status DC


Metoprolol Tartrate (Lopressor) 50 mg BID PO  Last administered on 9/16/18at 09:

43;  Start 9/16/18 at 10:00;  Stop 9/16/18 at 14:58;  Status DC


Lidocaine (Lidoderm) 1 patch DAILY TD  Last administered on 9/16/18at 09:42;  

Start 9/16/18 at 10:00


Miscellaneous (Lidoderm Patch Removal) 1 ea QHS MC ;  Start 9/16/18 at 21:00


Iohexol (Omnipaque 300 Mg/ml) 75 ml 1X  ONCE IV ;  Start 9/16/18 at 10:30;  

Stop 9/16/18 at 10:31;  Status DC


Info (Do NOT chart on this entry -- for MONITORING) 1 each PRN DAILY  PRN MC 

SEE COMMENTS;  Start 9/16/18 at 10:15;  Stop 9/18/18 at 10:14


Apixaban (Eliquis) 5 mg BID PO  Last administered on 9/16/18at 11:34;  Start 9/ 16/18 at 11:30;  Stop 9/16/18 at 14:58;  Status DC


Metoprolol Tartrate (Lopressor) 25 mg BID PO ;  Start 9/16/18 at 21:00


Enoxaparin Sodium (Lovenox 150mg Syringe) 140 mg Q12HR SQ ;  Start 9/16/18 at 21

:00





Active Scripts


Active


Reported


Benadryl (Diphenhydramine Hcl) 25 Mg Capsule 2 Cap PO QHS PRN


Edarbi (Azilsartan Medoxomil) 40 Mg Tablet 40 Mg PO BID


Invokamet  mg Tablet (Canagliflozin/Metformin HCl) 1 Each Tablet 1 Each 

PO DAILY


[unknown]


I have reviewed the current psychotropics carefully including drug 

interactions.  Risk benefit ratio favors no change other than as noted in my 

dictated progress note.





Diagnosis:


Problems:  


(1) Anxiety disorder


(2) Delirium due to general medical condition


(3) Diabetes mellitus











BLAIR CHANG MD Sep 16, 2018 19:29

## 2018-09-16 NOTE — PN
DATE:  



SUBJECTIVE:  A 47-year-old female came in with DKA and possible sepsis.  The

patient is doing much better this morning.  She has no complaints.  She has been

having some pain in her legs.  X-rays of the left leg demonstrated a problem

with severe degenerative arthritis.  CAT scan report of her head from her

admission showed bilateral basal ganglia lacunar infarcts, most likely chronic. 

Consider MRI scan as an outpatient for followup on the head.



Otherwise, her blood pressure is a little bit elevated at 180/99, although with

medications down to 150, pulse is in the 80s and 90s.  We will go ahead and

start her on metoprolol and we are trying to get a V/Q scan on her or CTA based

on positive D-dimer.  Temperature, she is afebrile.  Her white count has come

down from 20 down to 11, hemoglobin from 12 to 10, so she is making progress

there.  Her potassium has also come up otherwise.



PHYSICAL EXAMINATION:

GENERAL:  She is alert and oriented, although she does not make good eye

contact.

LUNGS:  Clear.

CARDIOVASCULAR:  Regular sinus rhythm.

ABDOMEN:  Markedly protuberant.

EXTREMITIES:  No clubbing, cyanosis.  Trace edema.

NEUROLOGIC:  Not knowing her baseline, I would assume this is about her

baseline.  She is a little bit more alert today than she was.  Nurses point now

to the point where she holds water in her mouth.  Speech therapy has evaluated

her, they do not think it is a problem with dysphagia, possibly some behavioral

problems as she does eat very well and swallows solids very well but in any case

needs something worked up as an outpatient.  



ASSESSMENT AND PLAN:  Otherwise, as indicated, diabetic ketoacidosis, sepsis,

morbid obesity, hypokalemia, previous history of bibasilar ganglia infarcts,

probably chronic.  We will continue monitoring her electrolytes, keeping her

sugar down and continue on antibiotic therapy.  We will add metoprolol to get

her pulse rate and blood pressure down.





______________________________

SUSIE KERR MD



DR:  ROHIT/melisa  JOB#:  [id number] / [id number]

DD:  09/16/2018 09:19  DT:  09/16/2018 21:58

## 2018-09-16 NOTE — RAD
Bilateral lower extremity venous ultrasound, 9/16/2018:

 

History: Pulmonary emboli

 

Duplex evaluation of the deep veins in the lower extremities was performed

including grayscale, color-flow and spectral Doppler analysis. The femoral

and popliteal veins demonstrate normal compressibility and normal 

responses to distal augmentation maneuvers.  Color imaging of those 

vessels shows no evidence of intraluminal clot.  The visualized deep veins

in both calves are patent. 

 

IMPRESSION:   There is no sonographic evidence of deep vein thrombosis in 

either lower extremity. 

 

Electronically signed by: Rick Moritz, MD (9/16/2018 1:13 PM) [facility]

## 2018-09-17 ENCOUNTER — HOSPITAL ENCOUNTER (INPATIENT)
Dept: HOSPITAL 63 - LND | Age: 48
LOS: 3 days | Discharge: ANOTHER HOSPITAL | DRG: 638 | End: 2018-09-20
Attending: FAMILY MEDICINE | Admitting: FAMILY MEDICINE
Payer: SELF-PAY

## 2018-09-17 VITALS — DIASTOLIC BLOOD PRESSURE: 83 MMHG | SYSTOLIC BLOOD PRESSURE: 159 MMHG

## 2018-09-17 VITALS — WEIGHT: 293 LBS | HEIGHT: 64 IN | BODY MASS INDEX: 50.02 KG/M2

## 2018-09-17 VITALS — SYSTOLIC BLOOD PRESSURE: 141 MMHG | DIASTOLIC BLOOD PRESSURE: 81 MMHG

## 2018-09-17 VITALS — DIASTOLIC BLOOD PRESSURE: 61 MMHG | SYSTOLIC BLOOD PRESSURE: 98 MMHG

## 2018-09-17 VITALS — SYSTOLIC BLOOD PRESSURE: 121 MMHG | DIASTOLIC BLOOD PRESSURE: 70 MMHG

## 2018-09-17 DIAGNOSIS — R13.10: ICD-10-CM

## 2018-09-17 DIAGNOSIS — E11.10: Primary | ICD-10-CM

## 2018-09-17 DIAGNOSIS — F05: ICD-10-CM

## 2018-09-17 DIAGNOSIS — F41.9: ICD-10-CM

## 2018-09-17 LAB
ANION GAP SERPL CALC-SCNC: 5 MMOL/L (ref 6–14)
BASOPHILS # BLD AUTO: 0.1 X10^3/UL (ref 0–0.2)
BASOPHILS NFR BLD: 1 % (ref 0–3)
CA-I SERPL ISE-MCNC: 7 MG/DL (ref 7–20)
CALCIUM SERPL-MCNC: 8.3 MG/DL (ref 8.5–10.1)
CHLORIDE SERPL-SCNC: 100 MMOL/L (ref 98–107)
CO2 SERPL-SCNC: 32 MMOL/L (ref 21–32)
CREAT SERPL-MCNC: 0.8 MG/DL (ref 0.6–1)
EOSINOPHIL NFR BLD: 0.2 X10^3/UL (ref 0–0.7)
EOSINOPHIL NFR BLD: 1 % (ref 0–3)
ERYTHROCYTE [DISTWIDTH] IN BLOOD BY AUTOMATED COUNT: 17.5 % (ref 11.5–14.5)
GFR SERPLBLD BASED ON 1.73 SQ M-ARVRAT: 76.9 ML/MIN
GLUCOSE SERPL-MCNC: 218 MG/DL (ref 70–99)
HCT VFR BLD CALC: 29.3 % (ref 36–47)
HGB BLD-MCNC: 9.7 G/DL (ref 12–15.5)
LYMPHOCYTES # BLD: 2.1 X10^3/UL (ref 1–4.8)
LYMPHOCYTES NFR BLD AUTO: 20 % (ref 24–48)
MCH RBC QN AUTO: 25 PG (ref 25–35)
MCHC RBC AUTO-ENTMCNC: 33 G/DL (ref 31–37)
MCV RBC AUTO: 76 FL (ref 79–100)
MONO #: 0.7 X10^3/UL (ref 0–1.1)
MONOCYTES NFR BLD: 7 % (ref 0–9)
NEUT #: 7.4 X10^3UL (ref 1.8–7.7)
NEUTROPHILS NFR BLD AUTO: 71 % (ref 31–73)
PLATELET # BLD AUTO: 226 X10^3/UL (ref 140–400)
POTASSIUM SERPL-SCNC: 3.4 MMOL/L (ref 3.5–5.1)
RBC # BLD AUTO: 3.87 X10^6/UL (ref 3.5–5.4)
SODIUM SERPL-SCNC: 137 MMOL/L (ref 136–145)
WBC # BLD AUTO: 10.5 X10^3/UL (ref 4–11)

## 2018-09-17 PROCEDURE — 80048 BASIC METABOLIC PNL TOTAL CA: CPT

## 2018-09-17 PROCEDURE — 82947 ASSAY GLUCOSE BLOOD QUANT: CPT

## 2018-09-17 PROCEDURE — 36415 COLL VENOUS BLD VENIPUNCTURE: CPT

## 2018-09-17 PROCEDURE — 97163 PT EVAL HIGH COMPLEX 45 MIN: CPT

## 2018-09-17 RX ADMIN — LIDOCAINE SCH PATCH: 50 PATCH CUTANEOUS at 07:53

## 2018-09-17 RX ADMIN — LOSARTAN POTASSIUM SCH MG: 50 TABLET, FILM COATED ORAL at 21:08

## 2018-09-17 RX ADMIN — METOPROLOL TARTRATE SCH MG: 25 TABLET, FILM COATED ORAL at 07:51

## 2018-09-17 RX ADMIN — METOPROLOL TARTRATE SCH MG: 25 TABLET, FILM COATED ORAL at 21:08

## 2018-09-17 RX ADMIN — INSULIN LISPRO SCH UNITS: 100 INJECTION, SOLUTION INTRAVENOUS; SUBCUTANEOUS at 07:56

## 2018-09-17 RX ADMIN — Medication SCH CAP: at 21:08

## 2018-09-17 RX ADMIN — Medication SCH EA: at 21:00

## 2018-09-17 RX ADMIN — INSULIN LISPRO SCH UNITS: 100 INJECTION, SOLUTION INTRAVENOUS; SUBCUTANEOUS at 12:10

## 2018-09-17 RX ADMIN — LOSARTAN POTASSIUM SCH MG: 50 TABLET, FILM COATED ORAL at 07:51

## 2018-09-17 RX ADMIN — Medication SCH CAP: at 07:54

## 2018-09-17 RX ADMIN — ENOXAPARIN SODIUM SCH MG: 150 INJECTION SUBCUTANEOUS at 07:53

## 2018-09-17 RX ADMIN — ENOXAPARIN SODIUM SCH MG: 150 INJECTION SUBCUTANEOUS at 21:07

## 2018-09-17 RX ADMIN — INSULIN LISPRO SCH UNITS: 100 INJECTION, SOLUTION INTRAVENOUS; SUBCUTANEOUS at 21:00

## 2018-09-17 RX ADMIN — INSULIN LISPRO SCH UNITS: 100 INJECTION, SOLUTION INTRAVENOUS; SUBCUTANEOUS at 17:42

## 2018-09-17 NOTE — CONS
DATE OF CONSULTATION:  09/15/2018



NEUROLOGICAL CONSULTATION



REFERRING PHYSICIAN:  Charles Pereyra MD



REASON FOR CONSULTATION:  Acute mental status changes.



HISTORY OF PRESENT ILLNESS:  This is a 47-year-old right-handed 

female, who was admitted to Emergency Room yesterday last night after she was

found on the floor by friends confused and disoriented.  According to the

patient, she fell to the floor at home.  She does not remember how and she

denies any preceding symptoms prior to fall.  In the Emergency Room, the patient

was found to have DKA with lactic acid of 5.3.  The patient denies any history

of seizure disorder or recent head injuries.  She denies headaches, visual

disturbances, nausea, vomiting, chest pain, shortness of breath or palpitation,

dysarthria, dysphagia, weakness or paresthesia.  Initial nonenhanced head CT

scan revealed old bilateral basal ganglia infarcts.  The blood sugar was about

600.



PAST MEDICAL HISTORY:  Significant for diabetes mellitus, morbid obesity, sleep

apnea.



PAST SURGICAL HISTORY:  Hernia repair.



SOCIAL HISTORY:  The patient lives alone at home.  She denies smoking, alcohol

drinking, or illicit drug use.



FAMILY HISTORY:  Noncontributory.



CURRENT MEDICATIONS:  Metoprolol 25 mg b.i.d., losartan 50 mg b.i.d.



REVIEW OF SYSTEMS:  A 10-point review of system was performed and consistent

with obesity and mental status changes.  Otherwise, as mentioned above in the

history of present illness.



PHYSICAL EXAMINATION:

GENERAL:  Morbidly obese white female, not in acute distress.  She weighs 306

pounds.

VITAL SIGNS:  Blood pressure is 156/77, respiratory rate 18, pulse is 85 and

regular, temperature is 98.7, oxygen saturation 95% on room air.

HEENT:  Normocephalic, atraumatic, otherwise, unremarkable.

NECK:  Supple.  Negative for carotid bruit, lymphadenopathy or thyromegaly.

LUNGS:  Clear to A and P.

CARDIOVASCULAR:  Regular rhythm, normal S1, S2.  There is no murmur.

ABDOMEN:  Soft.  Bowel sounds positive.

EXTREMITIES:  Negative for cyanosis, clubbing or pitting edema.

MENTAL STATUS:  The patient is alert and oriented x 3.  The speech is fluent. 

There is no language dysfunction.  Memory, judgment, and abstract thinking are

normal.  The patient denies hallucination or delusion.

CRANIAL NERVES:  Visual fields are full.  The pupils are reactive to light and

accommodation.  The extraocular movements are intact.  There is no nystagmus. 

There is no facial motor or sensory deficit.  Hearing is intact bilaterally. 

The palate is elevated symmetrically.  Sternocleidomastoid muscles are powerful

bilaterally.  The patient shrugs her shoulders symmetrically, protrudes her

tongue in the midline without fasciculation or atrophy.

MOTOR:  No focal muscle bulk was seen.  The tone is normal.  The strength is 4/5

throughout.  Sensory examination revealed normal pinprick and light touch senses

throughout.  Deep tendon reflexes were symmetric and hypoactive with absent

Achilles responses.  Gait not tested at this time.



LABORATORY DATA:  CBC revealed white blood cells of 17,000, hemoglobin 11.2,

hematocrit 35.9, platelet count 341,000.  Chemistry revealed sodium of 135,

potassium 3.3, chloride 99, CO2 of 33, BUN 10, creatinine 0.9 and glucose 225. 

Magnesium is 1.9.  Liver enzymes, elevated  and .  Creatinine

kinase is 1436.  Troponin level is normal.  Lipid profile revealed elevated

cholesterol and triglyceride with elevated LDL and low HDL at 37.  Urinalysis is

positive for urinary tract infections with trace urinary leukocyte esterase and

white blood cells between 5-10.



DIAGNOSTIC:  CT chest revealed bilateral pulmonary emboli.  Venous Doppler study

of the lower extremities is negative.  X-ray of the left lower extremities

revealed no evidence of fracture or dislocation.  Venous Doppler study of the

lower extremities revealed no evidence of DVT.  X-ray of the left lower

extremity revealed no acute fracture or dislocation of the bone or joints. 

Coagulation:  PT 9.8, INR is 1 and D-dimer is elevated at 5.77.



IMPRESSION:

1.  Acute encephalopathy, probably underlying systemic infections, urinary tract

infections.

2.  Bilateral pulmonary embolism.

3.  Urinary tract infections.

4.  Elevated CPK due to dehydration and possible recent injuries or fall.

5.  Obstructive sleep apnea, required CPAP.



RECOMMENDATION:

1.  Treat the underlying pulmonary embolism with heparin drip.

2.  Treat the underlying sepsis, probably urosepsis.

3.  Continue with current management initiated by Dr. Charles Pereyra.

4.  Continue with home medications and CPAP.





______________________________

M YUSUF LEON MD



DR:  SHARMIN/melisa  JOB#:  [id number] / [id number]

DD:  09/16/2018 16:26  DT:  09/17/2018 01:34

## 2018-09-17 NOTE — PDOC
Exam


Note:


Sebastian Note:


Please also refer to the separate dictated note~for this date of service 

dictated separately.~Patient seen individually. Discussed the patient with 

Nursing staff reviewed the chart.~Reviewed interim history and current 

functioning. Reviewed vital signs,~Labs/ Radiology~and current medications 

noted below. Continue current treatment with the changes noted in the dictated 

addendum note





Assessment:


Vital Signs:





 Vital Signs








  Date Time  Temp Pulse Resp B/P (MAP) Pulse Ox O2 Delivery O2 Flow Rate FiO2


 


9/17/18 14:14 98.8 67 20 141/81 (101) 97 Room Air  


 


9/16/18 22:33       1.0 








I&O











Intake and Output 


 


 9/17/18





 07:00


 


Intake Total 3837 ml


 


Output Total 3050 ml


 


Balance 787 ml


 


 


 


Intake Oral 720 ml


 


IV Total 3117 ml


 


Output Urine Total 3050 ml








Labs:





 Laboratory Tests








Test


 9/16/18


19:35 9/17/18


05:20 9/17/18


07:36 9/17/18


11:25


 


Glucose (Fingerstick)


 257 mg/dL


(70-99)  H 


 205 mg/dL


(70-99)  H 266 mg/dL


(70-99)  H


 


White Blood Count


 


 10.5 x10^3/uL


(4.0-11.0) 


 





 


Red Blood Count


 


 3.87 x10^6/uL


(3.50-5.40) 


 





 


Hemoglobin


 


 9.7 g/dL


(12.0-15.5)  L 


 





 


Hematocrit


 


 29.3 %


(36.0-47.0)  L 


 





 


Mean Corpuscular Volume


 


 76 fL ()


L 


 





 


Mean Corpuscular Hemoglobin  25 pg (25-35)    


 


Mean Corpuscular Hemoglobin


Concent 


 33 g/dL


(31-37) 


 





 


Red Cell Distribution Width


 


 17.5 %


(11.5-14.5)  H 


 





 


Platelet Count


 


 226 x10^3/uL


(140-400) 


 





 


Neutrophils (%) (Auto)  71 % (31-73)    


 


Lymphocytes (%) (Auto)  20 % (24-48)  L  


 


Monocytes (%) (Auto)  7 % (0-9)    


 


Eosinophils (%) (Auto)  1 % (0-3)    


 


Basophils (%) (Auto)  1 % (0-3)    


 


Neutrophils # (Auto)


 


 7.4 x10^3uL


(1.8-7.7) 


 





 


Lymphocytes # (Auto)


 


 2.1 x10^3/uL


(1.0-4.8) 


 





 


Monocytes # (Auto)


 


 0.7 x10^3/uL


(0.0-1.1) 


 





 


Eosinophils # (Auto)


 


 0.2 x10^3/uL


(0.0-0.7) 


 





 


Basophils # (Auto)


 


 0.1 x10^3/uL


(0.0-0.2) 


 





 


Sodium Level


 


 137 mmol/L


(136-145) 


 





 


Potassium Level


 


 3.4 mmol/L


(3.5-5.1)  L 


 





 


Chloride Level


 


 100 mmol/L


() 


 





 


Carbon Dioxide Level


 


 32 mmol/L


(21-32) 


 





 


Anion Gap  5 (6-14)  L  


 


Blood Urea Nitrogen


 


 7 mg/dL (7-20)


 


 





 


Creatinine


 


 0.8 mg/dL


(0.6-1.0) 


 





 


Estimated GFR


(Cockcroft-Gault) 


 76.9  


 


 





 


Glucose Level


 


 218 mg/dL


(70-99)  H 


 





 


Calcium Level


 


 8.3 mg/dL


(8.5-10.1)  L 


 





 


Test


 9/17/18


16:39 


 


 





 


Glucose (Fingerstick)


 286 mg/dL


(70-99)  H 


 


 














Current Medications:


Meds:





Current Medications


Insulin Human Regular 150 unit/ Sodium Chloride 151.5 ml @  0 mls/hr 1X  ONCE 

IV  Last administered on 9/14/18at 20:58;  Start 9/14/18 at 20:15;  Stop 9/14/ 18 at 20:16;  Status DC


Insulin Human Regular (HumuLIN R VIAL) 10 unit 1X  ONCE IV  Last administered 

on 9/14/18at 20:19;  Start 9/14/18 at 20:15;  Stop 9/14/18 at 20:16;  Status DC


Sodium Chloride 1,000 ml @  1,000 mls/hr 1X  ONCE IV  Last administered on 9/14/ 18at 20:09;  Start 9/14/18 at 20:15;  Stop 9/14/18 at 21:14;  Status DC


Sodium Chloride 150 ml @  As Directed STK-MED ONCE .ROUTE ;  Start 9/14/18 at 20

:09;  Stop 9/14/18 at 20:10;  Status DC


Sodium Chloride 1,000 ml @  1,000 mls/hr 1X  ONCE IV  Last administered on 9/14/ 18at 21:50;  Start 9/14/18 at 21:00;  Stop 9/14/18 at 21:59;  Status DC


Ondansetron HCl (Zofran) 4 mg PRN Q4HRS  PRN IV NAUSEA/VOMITING;  Start 9/14/18 

at 21:15;  Stop 9/15/18 at 21:14;  Status DC


Albuterol/ Ipratropium (Duoneb) 3 ml RTQID NEB  Last administered on 9/15/18at 

05:41;  Start 9/15/18 at 08:00;  Stop 9/15/18 at 10:33;  Status DC


Insulin Human Regular 150 unit/ Sodium Chloride 151.5 ml @  0 mls/hr 1X  ONCE 

IV ;  Start 9/14/18 at 21:15;  Stop 9/14/18 at 21:20;  Status DC


Levofloxacin (Levaquin) 500 mg QHS PO  Last administered on 9/16/18at 19:57;  

Start 9/14/18 at 22:00


Ceftriaxone Sodium 1 gm/ Sodium Chloride 50 ml @  100 mls/hr 1X  ONCE IV  Last 

administered on 9/14/18at 22:02;  Start 9/14/18 at 22:15;  Stop 9/14/18 at 22:44

;  Status DC


Ceftriaxone Sodium 1 gm/ Sodium Chloride 50 ml @  100 mls/hr QHS IV  Last 

administered on 9/16/18at 19:57;  Start 9/15/18 at 21:00


Sodium Chloride 50 ml @ As Directed STK-MED ONCE .ROUTE ;  Start 9/14/18 at 21:

55;  Stop 9/14/18 at 21:56;  Status DC


Ceftriaxone Sodium (Rocephin) 1 gm STK-MED ONCE IV ;  Start 9/14/18 at 21:55;  

Stop 9/14/18 at 21:56;  Status DC


Sodium Bicarbonate (Sodium Bicarb Adult 8.4% Syr) 50 meq 1X  ONCE IV  Last 

administered on 9/14/18at 23:01;  Start 9/14/18 at 22:30;  Stop 9/14/18 at 22:32

;  Status DC


Sodium Chloride 1,000 ml @  0 mls/hr Q0M IV ;  Start 9/14/18 at 23:47


Dextrose/Sodium Chloride 1,000 ml @  0 mls/hr Q0M IV  Last administered on 9/16/ 18at 01:21;  Start 9/14/18 at 23:47


Insulin Human Regular 150 unit/ Sodium Chloride 151.5 ml @  0 mls/hr CONT PRN  

PRN IV PER PROTOCOL;  Start 9/15/18 at 00:00;  Stop 9/16/18 at 09:40;  Status DC


Influenza Virus Vaccine (Afluria Trivalent 5879-4081 Syringe) 0.5 ml ONCE ONCE 

VAX IM  Last administered on 9/17/18at 08:01;  Start 9/17/18 at 09:00;  Stop 9/ 17/18 at 09:01;  Status DC


Potassium Chloride 100 ml @  50 mls/hr 1X  ONCE IV ;  Start 9/15/18 at 03:00;  

Stop 9/15/18 at 04:59;  Status UNV


Potassium Chloride/Dextrose/ Sod Cl 1,000 ml @  250 mls/hr Q4H IV  Last 

administered on 9/15/18at 03:04;  Start 9/15/18 at 03:00;  Stop 9/15/18 at 06:59

;  Status DC


Potassium Chloride (Klor-Con) 40 meq Q2H PO  Last administered on 9/15/18at 09:

16;  Start 9/15/18 at 07:30;  Stop 9/15/18 at 09:31;  Status DC


Lactobacillus Rhamnosus (Culturelle) 1 cap BID PO  Last administered on 9/17/ 18at 07:54;  Start 9/15/18 at 09:00


Info (Non-Icu Electrolyte Protocol) 1 ea CONT PRN  PRN MC PER PROTOCOL;  Start 9

/15/18 at 08:30


Losartan Potassium (Cozaar) 50 mg BID PO  Last administered on 9/17/18at 07:51;

  Start 9/15/18 at 12:00


Insulin Human Lispro (HumaLOG) 0-7 UNITS TIDWMEALS SQ  Last administered on 9/16 /18at 17:03;  Start 9/15/18 at 12:00;  Stop 9/16/18 at 19:54;  Status DC


Dextrose 12.5 gm PRN Q15MIN  PRN IV SEE COMMENTS;  Start 9/15/18 at 12:00


Insulin Human Lispro (HumaLOG) 4 units 1X  ONCE SQ  Last administered on 9/15/

18at 13:44;  Start 9/15/18 at 13:30;  Stop 9/15/18 at 13:31;  Status DC


Potassium Chloride (Klor-Con) 40 meq 1X  ONCE PO  Last administered on 9/15/

18at 15:45;  Start 9/15/18 at 15:45;  Stop 9/15/18 at 15:46;  Status DC


Iohexol (Omnipaque 300 Mg/ml) 75 ml 1X  ONCE IV  Last administered on 9/16/18at 

10:09;  Start 9/15/18 at 16:30;  Stop 9/15/18 at 16:31;  Status DC


Diphenhydramine HCl (Benadryl) 50 mg PRN QHS  PRN PO INSOMNIA;  Start 9/15/18 

at 16:45


Non-Formulary Medication (Canagliflozin/ Metformin HCl (Invokamet  mg 

Tablet)) 1 each DAILY PO ;  Start 9/16/18 at 09:00;  Stop 9/16/18 at 10:35;  

Status DC


Potassium Chloride (Klor-Con) 40 meq 1X  ONCE PO  Last administered on 9/16/ 18at 07:29;  Start 9/16/18 at 07:00;  Stop 9/16/18 at 07:01;  Status DC


Metoprolol Tartrate (Lopressor) 50 mg BID PO  Last administered on 9/16/18at 09:

43;  Start 9/16/18 at 10:00;  Stop 9/16/18 at 14:58;  Status DC


Lidocaine (Lidoderm) 1 patch DAILY TD  Last administered on 9/17/18at 07:53;  

Start 9/16/18 at 10:00


Miscellaneous (Lidoderm Patch Removal) 1 ea QHS MC  Last administered on 9/16/ 18at 19:57;  Start 9/16/18 at 21:00


Iohexol (Omnipaque 300 Mg/ml) 75 ml 1X  ONCE IV ;  Start 9/16/18 at 10:30;  

Stop 9/16/18 at 10:31;  Status DC


Info (Do NOT chart on this entry -- for MONITORING) 1 each PRN DAILY  PRN MC 

SEE COMMENTS;  Start 9/16/18 at 10:15;  Stop 9/18/18 at 10:14


Apixaban (Eliquis) 5 mg BID PO  Last administered on 9/16/18at 11:34;  Start 9/ 16/18 at 11:30;  Stop 9/16/18 at 14:58;  Status DC


Metoprolol Tartrate (Lopressor) 25 mg BID PO  Last administered on 9/17/18at 07:

51;  Start 9/16/18 at 21:00


Enoxaparin Sodium (Lovenox 150mg Syringe) 140 mg Q12HR SQ  Last administered on 

9/17/18at 07:53;  Start 9/16/18 at 21:00


Insulin Human Lispro (HumaLOG) 0-7 UNITS QIDACHS SQ  Last administered on 9/17/ 18at 17:42;  Start 9/16/18 at 21:00


Ferrous Sulfate (Feosol) 325 mg DAILYWBKFT PO  Last administered on 9/17/18at 10

:19;  Start 9/17/18 at 09:30


Info (Non-Icu Electrolyte Protocol) 1 ea CONT PRN  PRN MC PER PROTOCOL;  Start 9 /17/18 at 09:15





Active Scripts


Active


Ceftriaxone (Ceftriaxone Sodium) 1 Gm Vial 1 Gm IVP HS 7 Days


Lidocaine 1 Each Adh..patch 1 Patch TD DAILY 30 Days


Humalog (Insulin Lispro) 100 Unit/1 Ml Insuln.pen 0 Units SQ QIDACHS 60 Days


Culturelle (Lactobacillus Rhamnosus Gg) 1 Each Cap.sprink 1 Cap PO BID 60 Days


Cozaar (Losartan Potassium) 50 Mg Tablet 50 Mg PO BID 60 Days


Metoprolol Tartrate 25 Mg Tablet 25 Mg PO BID 60 Days


Levaquin (Levofloxacin) 500 Mg Tablet 500 Mg PO QHS 7 Days


Feosol (Ferrous Sulfate) 325 Mg Tablet 325 Mg PO DAILYWBKFT 7 Days


Reported


Zofran (Ondansetron Hcl) 4 Mg Tablet 1 Tab IV Q6HRS PRN


Benadryl (Diphenhydramine Hcl) 25 Mg Capsule 2 Cap PO QHS PRN


Invokamet  mg Tablet (Canagliflozin/Metformin HCl) 1 Each Tablet 1 Each 

PO DAILY


I have reviewed the current psychotropics carefully including drug 

interactions.  Risk benefit ratio favors no change other than as noted in my 

dictated progress note.





Diagnosis:


Problems:  


(1) Diabetes mellitus


(2) Delirium due to general medical condition


(3) Anxiety disorder











BLAIR CHANG MD Sep 17, 2018 18:28

## 2018-09-17 NOTE — PN
DATE:  09/17/2018



SUBJECTIVE:  A 47-year-old female in with a variety of medical problems, DKA,

probable sepsis, also recently diagnosed here with pulmonary emboli.  Also,

chronic lacunar infarctions.  Dr. Loomis and Dr. Cam have both reviewed the

patient and made comments about her care.  The patient otherwise seems to fall

asleep when I talked her.



PHYSICAL EXAMINATION:

VITAL SIGNS:  Blood pressure down to 120/70, respiratory rate 16, pulse 60,

afebrile.

GENERAL:  The patient is alert at times and then falls asleep.  She has very

poor eye contact.

LUNGS:  Diminished throughout, poor movement of air.

CARDIOVASCULAR:  Regular sinus rhythm.

ABDOMEN:  Soft, protuberant, morbidly obese.

EXTREMITIES:  Without clubbing, cyanosis, perhaps some trace edema.  She

probably has some lymphedema in her legs.

NEUROLOGIC:  The patient at times is alert and other times just going to fall

asleep, not answering my question.  I am not sure if this was invasion of trying

to answer questions or whatever.



LABORATORY DATA:  Her hemoglobin is down to 9.7 and 27.  Iron is little bit low.

 She is restarted on her iron.  Her liver enzymes were elevated.  We are doing a

liver scan on her.  Potassium was low.  She is on electrolyte replacement. 

Blood sugars were under much better control and venous Dopplers of the legs were

negative.  In any case, the patient apparently is too weak to stand on her feet.

 PT, OT is trying to work with her, but she is unable to stand up, so this

presents a significant issue and we will continue to work with PT, OT.



IMPRESSION:  Sepsis, diabetic ketoacidosis, hypokalemia, abnormal liver enzymes,

generalized weakness, deconditioning, history of cerebrovascular accidents,

morbid obesity with a BMI greater than 40, iron deficiency anemia,

mild-to-moderate protein malnutrition.





______________________________

SUSIE KERR MD



DR:  ROHIT/melisa  JOB#:  [id number] / [id number]

DD:  09/17/2018 09:20  DT:  09/17/2018 21:24

## 2018-09-18 VITALS — DIASTOLIC BLOOD PRESSURE: 86 MMHG | SYSTOLIC BLOOD PRESSURE: 145 MMHG

## 2018-09-18 VITALS — DIASTOLIC BLOOD PRESSURE: 77 MMHG | SYSTOLIC BLOOD PRESSURE: 149 MMHG

## 2018-09-18 LAB
ANION GAP SERPL CALC-SCNC: 5 MMOL/L (ref 6–14)
CA-I SERPL ISE-MCNC: 14 MG/DL (ref 7–20)
CALCIUM SERPL-MCNC: 8.8 MG/DL (ref 8.5–10.1)
CHLORIDE SERPL-SCNC: 102 MMOL/L (ref 98–107)
CO2 SERPL-SCNC: 32 MMOL/L (ref 21–32)
CREAT SERPL-MCNC: 0.8 MG/DL (ref 0.6–1)
GFR SERPLBLD BASED ON 1.73 SQ M-ARVRAT: 76.9 ML/MIN
GLUCOSE SERPL-MCNC: 172 MG/DL (ref 70–99)
POTASSIUM SERPL-SCNC: 3.2 MMOL/L (ref 3.5–5.1)
SODIUM SERPL-SCNC: 139 MMOL/L (ref 136–145)

## 2018-09-18 RX ADMIN — LOSARTAN POTASSIUM SCH MG: 50 TABLET, FILM COATED ORAL at 21:02

## 2018-09-18 RX ADMIN — METOPROLOL TARTRATE SCH MG: 25 TABLET, FILM COATED ORAL at 09:14

## 2018-09-18 RX ADMIN — Medication SCH CAP: at 21:00

## 2018-09-18 RX ADMIN — ENOXAPARIN SODIUM SCH MG: 150 INJECTION SUBCUTANEOUS at 21:01

## 2018-09-18 RX ADMIN — INSULIN LISPRO SCH UNITS: 100 INJECTION, SOLUTION INTRAVENOUS; SUBCUTANEOUS at 12:36

## 2018-09-18 RX ADMIN — INSULIN LISPRO SCH UNITS: 100 INJECTION, SOLUTION INTRAVENOUS; SUBCUTANEOUS at 08:00

## 2018-09-18 RX ADMIN — Medication SCH MG: at 09:14

## 2018-09-18 RX ADMIN — ENOXAPARIN SODIUM SCH MG: 150 INJECTION SUBCUTANEOUS at 09:15

## 2018-09-18 RX ADMIN — INSULIN LISPRO SCH UNITS: 100 INJECTION, SOLUTION INTRAVENOUS; SUBCUTANEOUS at 17:00

## 2018-09-18 RX ADMIN — METOPROLOL TARTRATE SCH MG: 25 TABLET, FILM COATED ORAL at 21:02

## 2018-09-18 RX ADMIN — Medication SCH CAP: at 21:01

## 2018-09-18 RX ADMIN — Medication SCH EA: at 21:00

## 2018-09-18 RX ADMIN — Medication SCH CAP: at 09:14

## 2018-09-18 RX ADMIN — LOSARTAN POTASSIUM SCH MG: 50 TABLET, FILM COATED ORAL at 09:14

## 2018-09-18 RX ADMIN — LOSARTAN POTASSIUM SCH MG: 50 TABLET, FILM COATED ORAL at 21:00

## 2018-09-18 RX ADMIN — Medication SCH EACH: at 09:00

## 2018-09-18 RX ADMIN — METOPROLOL TARTRATE SCH MG: 25 TABLET, FILM COATED ORAL at 21:00

## 2018-09-18 RX ADMIN — LIDOCAINE SCH PATCH: 50 PATCH CUTANEOUS at 09:17

## 2018-09-18 NOTE — CONS
DATE OF CONSULTATION:  09/16/2018



IDENTIFYING DATA:  The patient is a 47-year-old single  female seen in

bed 105, [facility], for a psychiatric consult requested by

Dr. Pereyra on account of her perceived confusion, anxiety and change in

mental status.  The patient seen individually, discussed with nursing staff,

reviewed the chart.



CHIEF COMPLAINT:  "I am okay."



HISTORY OF PRESENT ILLNESS:  The patient was admitted via the Emergency Room the

night of 09/14/2018 after she was found on the floor by friends confused and

disoriented at her home.  She states she was on the floor for about 6 hours,

does not remember how she fell and denied any previous symptoms prior to the

fall.  In the ER, she was in diabetic ketoacidosis with lactic acid 5.3.  No

history of seizure disorder or head injuries.  CT head with no contrast revealed

old bilateral basal ganglia infarcts.  Blood sugar was 600.  The patient states

since then her confusion is better, though at times she gets anxious, obsessive

and states she feels she has difficulty swallowing, and therefore, refuses her

medications.  She denies any prior relevant psychiatric history.  No suicidal or

homicidal ideation.  No symptoms reflective of bipolar disorder.



PAST PSYCHIATRIC HISTORY:  Noncontributory.



PAST MEDICAL HISTORY:  In addition to above, positive history of diabetes

mellitus, obesity, sleep apnea, UTI, bilateral pulmonary embolism, elevated CK

due to dehydration, recent fall, obstructive sleep apnea requiring CPAP.



PAST SURGICAL HISTORY:  Hernia repair.



CURRENT PSYCHOTROPICS:  Please refer to my initial note for details.



FAMILY HISTORY:  Noncontributory.



SOCIAL HISTORY:  The patient lives at home alone.  She states she drives to get

her groceries and does her own cooking and cleaning.  Family is involved in her

care.



REVIEW OF SYSTEMS:  Ten point review of systems consistent with her mental

status and obesity, lack of ambulation, though she did ambulate with physical

therapy staff earlier on 09/16/2018.



MENTAL STATUS EXAMINATION:  The patient is awake, alert, oriented reasonably by

the time I saw her evening of 09/16/2018.  She was aware of the date and the

president.  Speech coherent, abstraction fair, computation somewhat impaired. 

Attention span short.  Language function intact.  Intellect average.  No clear

psychotic symptoms, suicidal or homicidal ideation.  Attention span short.



IMPRESSION:  Delirium due to general medical condition seems to be resolving;

anxiety disorder, unspecified.  Rest as above.



PLAN:  From a psychiatric standpoint, I would not recommend anything differently

for now.  As her general medical condition resolves, I believe her mental status

should clear as well.  I would be happy to follow her and make any further

recommendations as needed.



Dr. Pereyra, thank you for the opportunity to participate in your patient's

care.  We will follow with you.





______________________________

MAN MAHESH CHANG MD



DR:  BINA/melisa  JOB#:  [id number] / [id number]

DD:  09/17/2018 15:38  DT:  09/18/2018 03:15

## 2018-09-18 NOTE — RAD
Complete abdomen ultrasound study

 

Clinical indications: Elevated liver function tests.

 

FINDINGS: This study is technically difficult due to the patient's body 

habitus and overlying bowel gas. Gallbladder is not visualized and the 

patient has been nothing by mouth for 6 hours. The extra hepatic bile duct

is partially visualized and measures 3.9 mm in caliber. There is diffuse 

attenuation of sound throughout the liver which may be seen with fatty 

infiltration of the liver. This decreases the sensitivity of sonography to

detect focal hepatic lesions. No focal hepatic mass is seen otherwise. The

liver is enlarged measuring 27.6 cm in length. The pancreas is poorly 

visualized due to overlying bowel gas and fatty infiltration of the liver.

The distal abdominal aorta is obscured by overlying bowel gas. No focal 

aneurysmal dilatation of the rest of the abdominal aorta is seen. The 

intrahepatic portion of the IVC appears patent. The length of the right 

kidney is 13.0 cm and the length of the left kidney is 13.1 cm. No 

hydronephrosis or renal mass or perinephric fluid collection is evident on

either side. The spleen measures 13.8 cm in length which is mildly 

enlarged. No ascites is evident. Urinary bladder volume is 312 cc.

 

IMPRESSION: Hepatosplenomegaly. Fatty infiltration of the liver.

 

The gallbladder is not visualized.

 

Electronically signed by: Gerardo Quintana MD (9/18/2018 8:26 AM) [facility]

## 2018-09-19 VITALS — DIASTOLIC BLOOD PRESSURE: 74 MMHG | SYSTOLIC BLOOD PRESSURE: 129 MMHG

## 2018-09-19 VITALS — DIASTOLIC BLOOD PRESSURE: 79 MMHG | SYSTOLIC BLOOD PRESSURE: 185 MMHG

## 2018-09-19 VITALS — DIASTOLIC BLOOD PRESSURE: 79 MMHG | SYSTOLIC BLOOD PRESSURE: 169 MMHG

## 2018-09-19 RX ADMIN — LOSARTAN POTASSIUM SCH MG: 50 TABLET, FILM COATED ORAL at 21:00

## 2018-09-19 RX ADMIN — INSULIN LISPRO SCH UNITS: 100 INJECTION, SOLUTION INTRAVENOUS; SUBCUTANEOUS at 12:29

## 2018-09-19 RX ADMIN — Medication SCH MG: at 08:00

## 2018-09-19 RX ADMIN — SODIUM CHLORIDE SCH MLS/HR: 0.9 INJECTION, SOLUTION INTRAVENOUS at 16:28

## 2018-09-19 RX ADMIN — Medication SCH EA: at 21:00

## 2018-09-19 RX ADMIN — Medication SCH CAP: at 09:00

## 2018-09-19 RX ADMIN — LOSARTAN POTASSIUM SCH MG: 50 TABLET, FILM COATED ORAL at 09:00

## 2018-09-19 RX ADMIN — LIDOCAINE SCH PATCH: 50 PATCH CUTANEOUS at 09:00

## 2018-09-19 RX ADMIN — Medication SCH EACH: at 09:00

## 2018-09-19 RX ADMIN — Medication SCH CAP: at 21:00

## 2018-09-19 RX ADMIN — METOPROLOL TARTRATE SCH MG: 25 TABLET, FILM COATED ORAL at 09:00

## 2018-09-19 RX ADMIN — INSULIN LISPRO SCH UNITS: 100 INJECTION, SOLUTION INTRAVENOUS; SUBCUTANEOUS at 17:00

## 2018-09-19 RX ADMIN — METOPROLOL TARTRATE SCH MG: 25 TABLET, FILM COATED ORAL at 21:00

## 2018-09-19 RX ADMIN — INSULIN LISPRO SCH UNITS: 100 INJECTION, SOLUTION INTRAVENOUS; SUBCUTANEOUS at 08:00

## 2018-09-19 RX ADMIN — ENOXAPARIN SODIUM SCH MG: 150 INJECTION SUBCUTANEOUS at 10:21

## 2018-09-19 RX ADMIN — ENOXAPARIN SODIUM SCH MG: 150 INJECTION SUBCUTANEOUS at 21:54

## 2018-09-19 NOTE — PN
DATE:  09/17/2018



This is a late entry for 09/17/2018 and covers elements not covered in my

initial note of 09/17/2018.



SUBJECTIVE:  I met with the patient individually in her room on the skilled

nursing unit when she was transferred from [facility].  Per nursing report, at

times the patient seems to pretend as if she does not know how to swallow.  She

seems somewhat "different" per nursing report.  She minimizes any psychotic

symptoms, denies being depressed as I questioned her.



REVIEW OF SYSTEMS:  Ambulation impaired.  As I met with her in her room, she was

on the cell phone, seemed to be texting.



MENTAL STATUS EXAM:  I spent some time in her room, but she continued to

manipulate her cell phone despite me trying to attract her attention and engage

her in a conversation.  She seemed somewhat distant, questionably psychotic. 

Again denied being depressed.  No suicidal or homicidal ideation.  Attention

span short.  Mood and affect somewhat anxious, labile at times, but not clearly

psychotic.



LABORATORY DATA:  Reviewed.



IMPRESSION:  Delirium due to general medical condition, diabetic ketoacidosis;

anxiety disorder, unspecified.



PLAN:  We will observe the patient another day and then make a decision whether

it would benefit for her to be on an SSRI agent, or perhaps Abilify as an

atypical antipsychotic.  We will decide in the next day or so.





______________________________

BLAIR CHANG MD



DR:  BINA/melisa  JOB#:  [id number] / [id number]

DD:  09/18/2018 17:07  DT:  09/18/2018 23:36

## 2018-09-19 NOTE — PN
DATE:  09/19/2018



SUBJECTIVE:  The patient is having trouble swallowing.  She has been having

difficulty with swallowing, holding food in her mouth.  We will need probably an

MRI, waiting for Neurology consult.  She will be kept n.p.o.



OBJECTIVE:

VITAL SIGNS:  Blood pressure 130/70, respiratory rate 20, pulse 70, afebrile.

GENERAL:  The patient is fairly alert, but no eye contact, noted holding food in

her mouth, unable to swallow.

LUNGS:  Clear.

CARDIOVASCULAR:  Stable.

ABDOMEN:  Soft, nontender.



IMPRESSION:  She has had previous swallowing test that were negative.  In any

case, the patient continues to be monitored carefully and we will go ahead and

probable transfer for further evaluation down at [place] once Neurology has

cleared her.





______________________________

SUSIE KERR MD



DR:  ROHIT/melisa  JOB#:  [id number] / [id number]

DD:  09/19/2018 11:01  DT:  09/19/2018 23:30

## 2018-09-20 VITALS — DIASTOLIC BLOOD PRESSURE: 80 MMHG | SYSTOLIC BLOOD PRESSURE: 177 MMHG

## 2018-09-20 VITALS — SYSTOLIC BLOOD PRESSURE: 174 MMHG | DIASTOLIC BLOOD PRESSURE: 82 MMHG

## 2018-09-20 RX ADMIN — METOPROLOL TARTRATE SCH MG: 25 TABLET, FILM COATED ORAL at 09:00

## 2018-09-20 RX ADMIN — ENOXAPARIN SODIUM SCH MG: 150 INJECTION SUBCUTANEOUS at 09:38

## 2018-09-20 RX ADMIN — INSULIN LISPRO SCH UNITS: 100 INJECTION, SOLUTION INTRAVENOUS; SUBCUTANEOUS at 08:00

## 2018-09-20 RX ADMIN — Medication SCH CAP: at 09:00

## 2018-09-20 RX ADMIN — LOSARTAN POTASSIUM SCH MG: 50 TABLET, FILM COATED ORAL at 09:00

## 2018-09-20 RX ADMIN — Medication SCH EACH: at 09:00

## 2018-09-20 RX ADMIN — LIDOCAINE SCH PATCH: 50 PATCH CUTANEOUS at 09:00

## 2018-09-20 RX ADMIN — Medication SCH MG: at 08:00

## 2018-09-20 RX ADMIN — SODIUM CHLORIDE SCH MLS/HR: 0.9 INJECTION, SOLUTION INTRAVENOUS at 04:03

## 2018-12-21 ENCOUNTER — HOSPITAL ENCOUNTER (INPATIENT)
Dept: HOSPITAL 63 - ER | Age: 48
LOS: 3 days | Discharge: HOSPICE | DRG: 100 | End: 2018-12-24
Attending: INTERNAL MEDICINE | Admitting: INTERNAL MEDICINE
Payer: COMMERCIAL

## 2018-12-21 VITALS — BODY MASS INDEX: 40.12 KG/M2 | WEIGHT: 235 LBS | HEIGHT: 64 IN

## 2018-12-21 VITALS — DIASTOLIC BLOOD PRESSURE: 63 MMHG | SYSTOLIC BLOOD PRESSURE: 109 MMHG

## 2018-12-21 DIAGNOSIS — R29.6: ICD-10-CM

## 2018-12-21 DIAGNOSIS — N17.9: ICD-10-CM

## 2018-12-21 DIAGNOSIS — F32.9: ICD-10-CM

## 2018-12-21 DIAGNOSIS — Z86.711: ICD-10-CM
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
## 2018-12-21 DIAGNOSIS — E86.0: ICD-10-CM

## 2018-12-21 DIAGNOSIS — Z51.5: ICD-10-CM

## 2018-12-21 DIAGNOSIS — D72.829: ICD-10-CM
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
## 2018-12-21 DIAGNOSIS — Z87.440: ICD-10-CM

## 2018-12-21 DIAGNOSIS — E66.01: ICD-10-CM

## 2018-12-21 DIAGNOSIS — R13.10: ICD-10-CM

## 2018-12-21 DIAGNOSIS — F41.9: ICD-10-CM

## 2018-12-21 DIAGNOSIS — I10: ICD-10-CM

## 2018-12-21 DIAGNOSIS — G93.40: ICD-10-CM

## 2018-12-21 DIAGNOSIS — I26.99: ICD-10-CM

## 2018-12-21 DIAGNOSIS — Z66: ICD-10-CM
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
## 2018-12-21 DIAGNOSIS — K21.9: ICD-10-CM

## 2018-12-21 DIAGNOSIS — G47.33: ICD-10-CM

## 2018-12-21 DIAGNOSIS — Z86.73: ICD-10-CM

## 2018-12-21 DIAGNOSIS — Z79.4: ICD-10-CM

## 2018-12-21 DIAGNOSIS — G40.909: Primary | ICD-10-CM
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
## 2018-12-21 DIAGNOSIS — Z79.899: ICD-10-CM

## 2018-12-21 DIAGNOSIS — E87.1: ICD-10-CM

## 2018-12-21 DIAGNOSIS — E78.5: ICD-10-CM

## 2018-12-21 DIAGNOSIS — D62: ICD-10-CM

## 2018-12-21 DIAGNOSIS — E11.9: ICD-10-CM

## 2018-12-21 LAB
% BANDS: 7 % (ref 0–9)
% LYMPHS: 18 % (ref 24–48)
% MONOS: 2 % (ref 0–10)
% MYELOS: 4 % (ref 0–0)
% SEGS: 68 % (ref 35–66)
ALBUMIN SERPL-MCNC: 2.9 G/DL (ref 3.4–5)
ALBUMIN/GLOB SERPL: 0.8 {RATIO} (ref 1–1.7)
ALP SERPL-CCNC: 244 U/L (ref 46–116)
ALT SERPL-CCNC: 28 U/L (ref 14–59)
ANION GAP SERPL CALC-SCNC: 12 MMOL/L (ref 6–14)
ANISOCYTOSIS BLD QL SMEAR: PRESENT
APTT PPP: YELLOW S
AST SERPL-CCNC: 25 U/L (ref 15–37)
BACTERIA #/AREA URNS HPF: 0 /HPF
BASOPHILS # BLD AUTO: 0.2 X10^3/UL (ref 0–0.2)
BASOPHILS NFR BLD: 1 % (ref 0–3)
BILIRUB SERPL-MCNC: 1.8 MG/DL (ref 0.2–1)
BILIRUB UR QL STRIP: (no result)
BUN/CREAT SERPL: 39 (ref 6–20)
CA-I SERPL ISE-MCNC: 39 MG/DL (ref 7–20)
CALCIUM SERPL-MCNC: 8.4 MG/DL (ref 8.5–10.1)
CHLORIDE SERPL-SCNC: 90 MMOL/L (ref 98–107)
CO2 SERPL-SCNC: 25 MMOL/L (ref 21–32)
CREAT SERPL-MCNC: 1 MG/DL (ref 0.6–1)
EOSINOPHIL NFR BLD AUTO: 1 % (ref 0–5)
EOSINOPHIL NFR BLD: 0 % (ref 0–3)
EOSINOPHIL NFR BLD: 0.1 X10^3/UL (ref 0–0.7)
ERYTHROCYTE [DISTWIDTH] IN BLOOD BY AUTOMATED COUNT: 18 % (ref 11.5–14.5)
FIBRINOGEN PPP-MCNC: (no result) MG/DL
GFR SERPLBLD BASED ON 1.73 SQ M-ARVRAT: 59.2 ML/MIN
GLOBULIN SER-MCNC: 3.5 G/DL (ref 2.2–3.8)
GLUCOSE SERPL-MCNC: 176 MG/DL (ref 70–99)
GLUCOSE UR STRIP-MCNC: (no result) MG/DL
HCT VFR BLD CALC: 23.1 % (ref 36–47)
HGB BLD-MCNC: 7.8 G/DL (ref 12–15.5)
HYALINE CASTS #/AREA URNS LPF: (no result) /HPF
LYMPHOCYTES # BLD: 5.6 X10^3/UL (ref 1–4.8)
LYMPHOCYTES NFR BLD AUTO: 21 % (ref 24–48)
MACROCYTES BLD QL SMEAR: PRESENT
MAGNESIUM SERPL-MCNC: 1.9 MG/DL (ref 1.8–2.4)
MCH RBC QN AUTO: 28 PG (ref 25–35)
MCHC RBC AUTO-ENTMCNC: 34 G/DL (ref 31–37)
MCV RBC AUTO: 82 FL (ref 79–100)
MONO #: 0.8 X10^3/UL (ref 0–1.1)
MONOCYTES NFR BLD: 3 % (ref 0–9)
NEUTROPHILS NFR BLD AUTO: 75 % (ref 31–73)
NITRITE UR QL STRIP: (no result)
NRBC # BLD MANUAL: 6 10*3/UL
OVALOCYTES BLD QL SMEAR: (no result)
PLATELET # BLD AUTO: 404 X10^3/UL (ref 140–400)
PLATELET # BLD EST: ADEQUATE 10*3/UL
POLYCHROMASIA BLD QL SMEAR: PRESENT
POTASSIUM SERPL-SCNC: 3.7 MMOL/L (ref 3.5–5.1)
PROT SERPL-MCNC: 6.4 G/DL (ref 6.4–8.2)
RBC # BLD AUTO: 2.82 X10^6/UL (ref 3.5–5.4)
RBC #/AREA URNS HPF: (no result) /HPF (ref 0–2)
SODIUM SERPL-SCNC: 127 MMOL/L (ref 136–145)
SP GR UR STRIP: 1.01
SQUAMOUS #/AREA URNS LPF: (no result) /LPF
TARGETS BLD QL SMEAR: (no result)
UROBILINOGEN UR-MCNC: 2 MG/DL
WBC # BLD AUTO: 26.5 X10^3/UL (ref 4–11)
WBC #/AREA URNS HPF: (no result) /HPF (ref 0–4)

## 2018-12-21 PROCEDURE — 84484 ASSAY OF TROPONIN QUANT: CPT

## 2018-12-21 PROCEDURE — 86901 BLOOD TYPING SEROLOGIC RH(D): CPT

## 2018-12-21 PROCEDURE — 86920 COMPATIBILITY TEST SPIN: CPT

## 2018-12-21 PROCEDURE — 86850 RBC ANTIBODY SCREEN: CPT

## 2018-12-21 PROCEDURE — 96375 TX/PRO/DX INJ NEW DRUG ADDON: CPT

## 2018-12-21 PROCEDURE — 81001 URINALYSIS AUTO W/SCOPE: CPT

## 2018-12-21 PROCEDURE — 72125 CT NECK SPINE W/O DYE: CPT

## 2018-12-21 PROCEDURE — 87040 BLOOD CULTURE FOR BACTERIA: CPT

## 2018-12-21 PROCEDURE — 82947 ASSAY GLUCOSE BLOOD QUANT: CPT

## 2018-12-21 PROCEDURE — 86900 BLOOD TYPING SEROLOGIC ABO: CPT

## 2018-12-21 PROCEDURE — P9016 RBC LEUKOCYTES REDUCED: HCPCS

## 2018-12-21 PROCEDURE — 84443 ASSAY THYROID STIM HORMONE: CPT

## 2018-12-21 PROCEDURE — 83605 ASSAY OF LACTIC ACID: CPT

## 2018-12-21 PROCEDURE — 36415 COLL VENOUS BLD VENIPUNCTURE: CPT

## 2018-12-21 PROCEDURE — 85018 HEMOGLOBIN: CPT

## 2018-12-21 PROCEDURE — 96365 THER/PROPH/DIAG IV INF INIT: CPT

## 2018-12-21 PROCEDURE — 72192 CT PELVIS W/O DYE: CPT

## 2018-12-21 PROCEDURE — 85025 COMPLETE CBC W/AUTO DIFF WBC: CPT

## 2018-12-21 PROCEDURE — 71250 CT THORAX DX C-: CPT

## 2018-12-21 PROCEDURE — 87641 MR-STAPH DNA AMP PROBE: CPT

## 2018-12-21 PROCEDURE — 71045 X-RAY EXAM CHEST 1 VIEW: CPT

## 2018-12-21 PROCEDURE — 93005 ELECTROCARDIOGRAM TRACING: CPT

## 2018-12-21 PROCEDURE — 80053 COMPREHEN METABOLIC PANEL: CPT

## 2018-12-21 PROCEDURE — 76705 ECHO EXAM OF ABDOMEN: CPT

## 2018-12-21 PROCEDURE — 85007 BL SMEAR W/DIFF WBC COUNT: CPT

## 2018-12-21 PROCEDURE — 70450 CT HEAD/BRAIN W/O DYE: CPT

## 2018-12-21 PROCEDURE — 85014 HEMATOCRIT: CPT

## 2018-12-21 PROCEDURE — 80048 BASIC METABOLIC PNL TOTAL CA: CPT

## 2018-12-21 PROCEDURE — 80202 ASSAY OF VANCOMYCIN: CPT

## 2018-12-21 PROCEDURE — 83735 ASSAY OF MAGNESIUM: CPT

## 2018-12-21 RX ADMIN — SODIUM CHLORIDE SCH MLS/HR: 0.9 INJECTION, SOLUTION INTRAVENOUS at 19:17

## 2018-12-21 NOTE — EKG
[facility] [address], [place] [address]

Test Date:    2018               Test Time:    15:03:39

Pat Name:     BHAVIN OBREGON          Department:   

Patient ID:   [id number]           Room:          

Gender:       F                        Technician:   

:          1970               Requested By: ZAKIA LANDAVERDE

Order Number: [id number]            Reading MD:     

                                 Measurements

Intervals                              Axis          

Rate:         104                      P:            33

AL:           144                      QRS:          8

QRSD:         86                       T:            173

QT:           350                                    

QTc:          467                                    

                           Interpretive Statements

SINUS TACHYCARDIA

ST & T ABNORMALITY, CONSIDER

HIGH LATERAL ISCHEMIA OR LEFT VENTRICULAR STRAIN

T ABNORMALITY IN LATERAL LEADS

INFEROLATERAL LEADS

ABNORMAL ECG

RI6.01          Unconfirmed report

No previous ECG available for comparison

## 2018-12-21 NOTE — RAD
Examination: CT head and cervical spine without contrast

 

 

CT HEAD

 

INDICATION: FELL TODAY

 

COMPARISON: 9/14/2018

 

Exposure: One or more of the following individualized dose reduction 

techniques were utilized for this examination:  1. Automated exposure 

control  2. Adjustment of the mA and/or kV according to patient size  3. 

Use of iterative reconstruction technique

 

TECHNIQUE: 5 mm contiguous axial images were obtained from the skull base 

to the vertex in both bone and soft tissue algorithm.  

 

FINDINGS: 

 

Mild bilateral periventricular white matter hypodensities likely chronic 

small vessel ischemic disease. Unchanged hypodensities identified in the 

bilateral basal ganglia likely old lacunar infarcts.  

 

No evidence of acute intracranial hemorrhage.   No extra-axial fluid 

collections.

 

No mass effect or midline shift. Ventricular size is appropriate.  Basal 

cisterns are patent.

 

No fractures identified.Gray-white differentiation is preserved.Globes and

orbits are within normal limits.   Paranasal sinuses and mastoid air cells

are clear.   

 

IMPRESSION:

 

 

1. No acute intracranial findings.

 

2. Hypodensities identified in the bilateral basal ganglia similar to 

prior exam likely old lacunar infarcts.

 

 

CT CERVICAL SPINE

 

INDICATION: FELL TODAY

 

COMPARISON: None Available.

 

Technique: 2.5 mm contiguous axial images were obtained from the skull 

base through the cervicothoracic junction in both bone and soft tissue 

algorithm.  Additional sagittal and coronal reconstructions were also 

performed. 

 

FINDINGS: Vertebral body height and alignment are maintained.  Cervical 

lordosis is preserved.  The lateral masses of C1 are aligned upon C2.  

 

No fractures identified.  The bony canal is patent throughout.

 

Mild intervertebral disc height loss identified in the cervical spine 

throughout.  

 

The paraspinous soft tissues are unremarkable.  Visualized intracranial 

contents are unremarkable.  Lung apices are clear. 

 

IMPRESSION:

 

 

1. No acute fracture of the cervical spine. Correlate clinically.

 

2. Mild degenerative changes cervical spine.  

 

Electronically signed by: Tomi Lira MD (12/21/2018 5:23 PM) [id number]

## 2018-12-21 NOTE — PHYS DOC
Past History


Past Medical History:  Depression, Diabetes, UTI, Other


Past Surgical History:  No Surgical History


Alcohol Use:  None


Drug Use:  None





Adult General


Chief Complaint


Chief Complaint:  WEAKNESS/GENERALIZED





HPI


HPI





Patient is a 48-year-old female who presents from nursing home with report of 

mental status change. Report from nursing home was quite limited; however, 

after discussing patient's case with Dr. Alejo, he reports that patient has had 

recurrent seizures in the nursing home. Additional history is limited as 

patient is nonverbal.





Review of Systems


Review of Systems





Constitutional: Denies fever []


Respiratory: No report of shortness of breath []


Cardiovascular: No additional information not addressed in HPI []


GI: No report of vomiting or diarrhea []


Integument: Multiple ecchymoses[]


Neurologic: Positive recurrent seizures and mental status changes []





A full review of systems is limited due to patient mental status.





Allergies


Allergies





Allergies








Coded Allergies Type Severity Reaction Last Updated Verified


 


  No Known Drug Allergies    9/14/18 No











Physical Exam


Physical Exam





Constitutional: Well developed, well nourished, no acute distress, non-toxic 

appearance. []


HENT: Normocephalic, atraumatic, bilateral external ears normal, oropharynx 

moist, no oral exudates, nose normal. []


Eyes: PERRLA, EOMI, conjunctiva normal, no discharge. [] 


Neck: Normal range of motion, no tenderness, supple. [] 


Cardiovascular: Mildly tachycardic rate with regular rhythm []


Lungs & Thorax:  Bilateral breath sounds clear to auscultation []


Abdomen: Bowel sounds normal, soft, no tenderness. [] 


Skin: Warm, dry. Large areas of ecchymosis are noted around the left and right 

hip area [] 


Extremities: No tenderness, no cyanosis, no clubbing. [] 


Neurologic: Eyes are open spontaneously. Patient is not responsive to verbal 

stimuli. Unable to fully assess neurological status as patient does not follow 

commands. []





Current Patient Data


Vital Signs





 Vital Signs








  Date Time  Temp Pulse Resp B/P (MAP) Pulse Ox O2 Delivery O2 Flow Rate FiO2


 


12/21/18 16:06  100 20 110/99 (103) 99 Room Air  


 


12/21/18 14:55 98.7       








Lab Results





 Laboratory Tests








Test


 12/21/18


15:15 12/21/18


16:00


 


White Blood Count


 26.5 x10^3/uL


(4.0-11.0)  H 





 


Red Blood Count


 2.82 x10^6/uL


(3.50-5.40)  L 





 


Hemoglobin


 7.8 g/dL


(12.0-15.5)  L 





 


Hematocrit


 23.1 %


(36.0-47.0)  L 





 


Mean Corpuscular Volume


 82 fL ()


 





 


Mean Corpuscular Hemoglobin 28 pg (25-35)   


 


Mean Corpuscular Hemoglobin


Concent 34 g/dL


(31-37) 





 


Red Cell Distribution Width


 18.0 %


(11.5-14.5)  H 





 


Platelet Count


 404 x10^3/uL


(140-400)  H 





 


Neutrophils (%) (Auto) 75 % (31-73)  H 


 


Lymphocytes (%) (Auto) 21 % (24-48)  L 


 


Monocytes (%) (Auto) 3 % (0-9)   


 


Eosinophils (%) (Auto) 0 % (0-3)   


 


Basophils (%) (Auto) 1 % (0-3)   


 


Neutrophils # (Auto)


 19.9 x10^3uL


(1.8-7.7)  H 





 


Lymphocytes # (Auto)


 5.6 x10^3/uL


(1.0-4.8)  H 





 


Monocytes # (Auto)


 0.8 x10^3/uL


(0.0-1.1) 





 


Eosinophils # (Auto)


 0.1 x10^3/uL


(0.0-0.7) 





 


Basophils # (Auto)


 0.2 x10^3/uL


(0.0-0.2) 





 


Platelet Estimate Pending   


 


Sodium Level


 127 mmol/L


(136-145)  L 





 


Potassium Level


 3.7 mmol/L


(3.5-5.1) 





 


Chloride Level


 90 mmol/L


()  L 





 


Carbon Dioxide Level


 25 mmol/L


(21-32) 





 


Anion Gap 12 (6-14)   


 


Blood Urea Nitrogen


 39 mg/dL


(7-20)  H 





 


Creatinine


 1.0 mg/dL


(0.6-1.0) 





 


Estimated GFR


(Cockcroft-Gault) 59.2  


 





 


BUN/Creatinine Ratio 39 (6-20)  H 


 


Glucose Level


 176 mg/dL


(70-99)  H 





 


Calcium Level


 8.4 mg/dL


(8.5-10.1)  L 





 


Magnesium Level


 1.9 mg/dL


(1.8-2.4) 





 


Total Bilirubin


 1.8 mg/dL


(0.2-1.0)  H 





 


Aspartate Amino Transferase


(AST) 25 U/L (15-37)


 





 


Alanine Aminotransferase (ALT)


 28 U/L (14-59)


 





 


Alkaline Phosphatase


 244 U/L


()  H 





 


Troponin I Quantitative


 < 0.017 ng/mL


(0-0.055) 





 


Total Protein


 6.4 g/dL


(6.4-8.2) 





 


Albumin


 2.9 g/dL


(3.4-5.0)  L 





 


Albumin/Globulin Ratio


 0.8 (1.0-1.7)


L 





 


Urine Collection Type  U cath  


 


Urine Color  Yellow  


 


Urine Clarity  Hazy  


 


Urine pH  5.5  


 


Urine Specific Gravity  1.015  


 


Urine Protein


 


 Trace


(NEG-TRACE)


 


Urine Glucose (UA)


 


 Neg mg/dL


(NEG)


 


Urine Ketones (Stick)


 


 Neg mg/dL


(NEG)


 


Urine Blood  Neg (NEG)  


 


Urine Nitrite  Neg (NEG)  


 


Urine Bilirubin  Neg (NEG)  


 


Urine Urobilinogen Dipstick


 


 2 mg/dL (0.2


mg/dL)


 


Urine Leukocyte Esterase  Neg (NEG)  


 


Urine RBC


 


 3-5 /HPF (0-2)





 


Urine WBC


 


 1-4 /HPF (0-4)





 


Urine Squamous Epithelial


Cells 


 Mod /LPF  





 


Urine Bacteria


 


 0 /HPF (0-FEW)





 


Urine Hyaline Casts  Mod /HPF  


 


Urine Mucus  Mod /LPF  











EKG


EKG


[]





Radiology/Procedures


Radiology/Procedures


[]


Impressions:


PROCEDURE: CT HEAD AND CERVICAL SPINE WO





Examination: CT head and cervical spine without contrast


 


 


CT HEAD


 


INDICATION: FELL TODAY


 


COMPARISON: 9/14/2018


 


Exposure: One or more of the following individualized dose reduction 


techniques were utilized for this examination:  1. Automated exposure 


control  2. Adjustment of the mA and/or kV according to patient size  3. 


Use of iterative reconstruction technique


 


TECHNIQUE: 5 mm contiguous axial images were obtained from the skull base 


to the vertex in both bone and soft tissue algorithm.  


 


FINDINGS: 


 


Mild bilateral periventricular white matter hypodensities likely chronic 


small vessel ischemic disease. Unchanged hypodensities identified in the 


bilateral basal ganglia likely old lacunar infarcts.  


 


No evidence of acute intracranial hemorrhage.   No extra-axial fluid 


collections.


 


No mass effect or midline shift. Ventricular size is appropriate.  Basal 


cisterns are patent.


 


No fractures identified.Gray-white differentiation is preserved.Globes and


orbits are within normal limits.   Paranasal sinuses and mastoid air cells


are clear.   


 


IMPRESSION:


 


 


1. No acute intracranial findings.


 


2. Hypodensities identified in the bilateral basal ganglia similar to 


prior exam likely old lacunar infarcts.


 


 


CT CERVICAL SPINE


 


INDICATION: FELL TODAY


 


COMPARISON: None Available.


 


Technique: 2.5 mm contiguous axial images were obtained from the skull 


base through the cervicothoracic junction in both bone and soft tissue 


algorithm.  Additional sagittal and coronal reconstructions were also 


performed. 


 


FINDINGS: Vertebral body height and alignment are maintained.  Cervical 


lordosis is preserved.  The lateral masses of C1 are aligned upon C2.  


 


No fractures identified.  The bony canal is patent throughout.


 


Mild intervertebral disc height loss identified in the cervical spine 


throughout.  


 


The paraspinous soft tissues are unremarkable.  Visualized intracranial 


contents are unremarkable.  Lung apices are clear. 


 


IMPRESSION:


 


 


1. No acute fracture of the cervical spine. Correlate clinically.


 


2. Mild degenerative changes cervical spine.  


 


Electronically signed by: Tomi Lira MD (12/21/2018 5:23 PM) [id number]














DICTATED AND SIGNED BY:     TOMI LIRA MD


DATE:     12/21/18 1715





CC: ZAKIA LANDAVERDE Jr. DO; ANNIKA VELASQUEZ DO ~





Course & Med Decision Making


Course & Med Decision Making


Pertinent Labs and Imaging studies reviewed. (See chart for details)





Patient moved to room upon arrival was evaluated by your medical staff after 

which a workup was initiated to include blood work, CT imaging of the head and 

cervical spine as well as pelvis and chest x-ray. Imaging was reviewed and is 

unremarkable. Blood work returned with findings of significantly elevated white 

blood cell count. Patient's case was discussed with Dr. Alejo and he reports 

that patient has been having recurrent seizures at nursing home. He has 

requested that patient be started on IV Keppra and have patient evaluated with 

Dr. Ohara. He is further requesting CT imaging of the chest to rule out occult 

pneumonia.





Dragon Disclaimer


Dragon Disclaimer


This electronic medical record was generated, in whole or in part, using a 

voice recognition dictation system.





Departure


Departure:


Impression:  


 Primary Impression:  


 Altered mental status


 Additional Impressions:  


 Recurrent seizures


 Leukocytosis


Disposition:  09 ADMITTED AS INPATIENT


Admitting Physician:  Oswald Alejo


Condition:  GOOD


Referrals:  


ANNIKA VELASQUEZ DO (PCP)





Problem Qualifiers








 Primary Impression:  


 Altered mental status


 Altered mental status type:  unspecified  Qualified Codes:  R41.82 - Altered 

mental status, unspecified


 Additional Impressions:  


 Leukocytosis


 Leukocytosis type:  unspecified  Qualified Codes:  D72.829 - Elevated white 

blood cell count, unspecified








ZAKIA LANDAVERDE Jr. DO Dec 21, 2018 19:03

## 2018-12-21 NOTE — RAD
CT scan of the pelvis without contrast 12/21/2018

 

CLINICAL HISTORY: Fall with bruising around both hips.

 

TECHNIQUE: Unenhanced contiguous, 0.625 mm axial sections were obtained 

through the pelvis and both hips. 5 mm reconstructed axial, sagittal and 

coronal images were obtained.

 

One or more of the following individualized dose reduction techniques were

utilized for this study:

 

1. Automated exposure control.

2. Adjustment of the mA and/or kV according to patient size.

3. Use of iterative reconstruction technique.

 

 

FINDINGS: No pelvic bone fracture is seen. Both hips are intact. A 

hematoma is seen within the subcutaneous fat in the left gluteal region 

which measures 16 x 14 x 8 cm transverse, craniocaudal and AP dimensions. 

Calcifications are seen within the pelvis consistent with phleboliths. 

Surgical clips are seen within the lower abdomen/pelvis. No free fluid is 

seen. Small soft tissue nodules are seen within this subcutaneous fat of 

the anterior abdominal wall within the lower abdomen/pelvis which measure 

1 to 3.5 cm in size. This could represent injection granulomas are 

conceivably hematomas.

 

IMPRESSION:

1. No pelvic bone or hip fracture is seen.

2. 16 cm hematoma is seen within the left gluteal region.

 

Electronically signed by: Bravo Bond MD (12/21/2018 5:13 PM) [facility] [place]

## 2018-12-22 VITALS — SYSTOLIC BLOOD PRESSURE: 97 MMHG | DIASTOLIC BLOOD PRESSURE: 56 MMHG

## 2018-12-22 VITALS — DIASTOLIC BLOOD PRESSURE: 57 MMHG | SYSTOLIC BLOOD PRESSURE: 102 MMHG

## 2018-12-22 VITALS — DIASTOLIC BLOOD PRESSURE: 52 MMHG | SYSTOLIC BLOOD PRESSURE: 109 MMHG

## 2018-12-22 VITALS — DIASTOLIC BLOOD PRESSURE: 44 MMHG | SYSTOLIC BLOOD PRESSURE: 110 MMHG

## 2018-12-22 VITALS — SYSTOLIC BLOOD PRESSURE: 90 MMHG | DIASTOLIC BLOOD PRESSURE: 54 MMHG

## 2018-12-22 VITALS — DIASTOLIC BLOOD PRESSURE: 61 MMHG | SYSTOLIC BLOOD PRESSURE: 93 MMHG

## 2018-12-22 VITALS — DIASTOLIC BLOOD PRESSURE: 63 MMHG | SYSTOLIC BLOOD PRESSURE: 101 MMHG

## 2018-12-22 VITALS — SYSTOLIC BLOOD PRESSURE: 118 MMHG | DIASTOLIC BLOOD PRESSURE: 62 MMHG

## 2018-12-22 VITALS — SYSTOLIC BLOOD PRESSURE: 82 MMHG | DIASTOLIC BLOOD PRESSURE: 52 MMHG

## 2018-12-22 VITALS — DIASTOLIC BLOOD PRESSURE: 54 MMHG | SYSTOLIC BLOOD PRESSURE: 97 MMHG

## 2018-12-22 VITALS — SYSTOLIC BLOOD PRESSURE: 97 MMHG | DIASTOLIC BLOOD PRESSURE: 59 MMHG

## 2018-12-22 VITALS — DIASTOLIC BLOOD PRESSURE: 49 MMHG | SYSTOLIC BLOOD PRESSURE: 80 MMHG

## 2018-12-22 VITALS — DIASTOLIC BLOOD PRESSURE: 57 MMHG | SYSTOLIC BLOOD PRESSURE: 100 MMHG

## 2018-12-22 LAB
ANION GAP SERPL CALC-SCNC: 11 MMOL/L (ref 6–14)
BASOPHILS # BLD AUTO: 0.1 X10^3/UL (ref 0–0.2)
BASOPHILS NFR BLD: 1 % (ref 0–3)
CA-I SERPL ISE-MCNC: 38 MG/DL (ref 7–20)
CALCIUM SERPL-MCNC: 7.8 MG/DL (ref 8.5–10.1)
CHLORIDE SERPL-SCNC: 94 MMOL/L (ref 98–107)
CO2 SERPL-SCNC: 25 MMOL/L (ref 21–32)
CREAT SERPL-MCNC: 1 MG/DL (ref 0.6–1)
EOSINOPHIL NFR BLD: 0.1 X10^3/UL (ref 0–0.7)
EOSINOPHIL NFR BLD: 1 % (ref 0–3)
ERYTHROCYTE [DISTWIDTH] IN BLOOD BY AUTOMATED COUNT: 18.1 % (ref 11.5–14.5)
GFR SERPLBLD BASED ON 1.73 SQ M-ARVRAT: 59.2 ML/MIN
GLUCOSE SERPL-MCNC: 147 MG/DL (ref 70–99)
HCT VFR BLD CALC: 20.4 % (ref 36–47)
HCT VFR BLD CALC: 22.1 % (ref 36–47)
HGB BLD-MCNC: 6.8 G/DL (ref 12–15.5)
HGB BLD-MCNC: 7.3 G/DL (ref 12–15.5)
LYMPHOCYTES # BLD: 4.8 X10^3/UL (ref 1–4.8)
LYMPHOCYTES NFR BLD AUTO: 22 % (ref 24–48)
MCH RBC QN AUTO: 28 PG (ref 25–35)
MCHC RBC AUTO-ENTMCNC: 34 G/DL (ref 31–37)
MCV RBC AUTO: 82 FL (ref 79–100)
MONO #: 1.5 X10^3/UL (ref 0–1.1)
MONOCYTES NFR BLD: 7 % (ref 0–9)
NEUT #: 14.8 X10^3UL (ref 1.8–7.7)
NEUTROPHILS NFR BLD AUTO: 69 % (ref 31–73)
PLATELET # BLD AUTO: 355 X10^3/UL (ref 140–400)
POTASSIUM SERPL-SCNC: 3.8 MMOL/L (ref 3.5–5.1)
RBC # BLD AUTO: 2.48 X10^6/UL (ref 3.5–5.4)
SODIUM SERPL-SCNC: 130 MMOL/L (ref 136–145)
WBC # BLD AUTO: 21.3 X10^3/UL (ref 4–11)

## 2018-12-22 PROCEDURE — 30233N1 TRANSFUSION OF NONAUTOLOGOUS RED BLOOD CELLS INTO PERIPHERAL VEIN, PERCUTANEOUS APPROACH: ICD-10-PCS | Performed by: INTERNAL MEDICINE

## 2018-12-22 RX ADMIN — VANCOMYCIN HYDROCHLORIDE PRN EACH: 1 INJECTION, POWDER, LYOPHILIZED, FOR SOLUTION INTRAVENOUS at 05:07

## 2018-12-22 RX ADMIN — Medication SCH CAP: at 21:00

## 2018-12-22 RX ADMIN — PIPERACILLIN SODIUM AND TAZOBACTAM SODIUM SCH MLS/HR: 3; .375 INJECTION, POWDER, LYOPHILIZED, FOR SOLUTION INTRAVENOUS at 12:25

## 2018-12-22 RX ADMIN — PIPERACILLIN SODIUM AND TAZOBACTAM SODIUM SCH MLS/HR: 3; .375 INJECTION, POWDER, LYOPHILIZED, FOR SOLUTION INTRAVENOUS at 08:29

## 2018-12-22 RX ADMIN — PIPERACILLIN SODIUM AND TAZOBACTAM SODIUM SCH MLS/HR: 3; .375 INJECTION, POWDER, LYOPHILIZED, FOR SOLUTION INTRAVENOUS at 18:22

## 2018-12-22 RX ADMIN — SODIUM CHLORIDE SCH MLS/HR: 0.9 INJECTION, SOLUTION INTRAVENOUS at 01:29

## 2018-12-22 RX ADMIN — SODIUM CHLORIDE SCH MLS/HR: 0.9 INJECTION, SOLUTION INTRAVENOUS at 16:32

## 2018-12-22 RX ADMIN — VANCOMYCIN HYDROCHLORIDE SCH MLS/HR: 1 INJECTION, POWDER, LYOPHILIZED, FOR SOLUTION INTRAVENOUS at 09:20

## 2018-12-22 RX ADMIN — Medication SCH CAP: at 09:00

## 2018-12-22 RX ADMIN — VANCOMYCIN HYDROCHLORIDE SCH MLS/HR: 1 INJECTION, POWDER, LYOPHILIZED, FOR SOLUTION INTRAVENOUS at 20:42

## 2018-12-22 NOTE — HP
ADMIT DATE:  12/21/2018



HISTORY OF PRESENT ILLNESS:  The patient is a 48-year-old  female

patient, a resident at [facility] in [place], who apparently was noted

by the nursing staff to have what seemed to be altered mental status as well as

seizure-like activity and therefore, the patient was transferred to the

Emergency Room of [facility] where she was extensively investigated. 

Her CT scan showed that she has old lacunar infarct.  She has also large

hematoma on the left side of her head, likely because of the large dose of

Lovenox for anticoagulation, as the patient is refusing to eat or drink,

although we have extensively investigated her, we did multiple swallowing

evaluations and she has had esophagogastroduodenoscopy, which showed no evidence

of any stricture.  She was seen also by neurologist and many psychiatrists and

basically, the patient is starving herself to death.  She has multiple strokes

before and she has also bilateral pulmonary emboli; however, she was not taking

any medication by mouth and therefore, we anticoagulated her with Lovenox and

treated her blood pressure with clonidine patch, so she was basically evaluated

in the Emergency Room.  We gave her a loading dose of Keppra.  She was found to

be dehydrated and anemic.



PAST MEDICAL HISTORY:  Significant for hypertension, morbid obesity with

obstructive sleep apnea, severe type 2 diabetes mellitus.  She has also multiple

CVAs and bilateral pulmonary emboli.



PAST SURGICAL HISTORY:  Significant for hernia repair.  She underwent also

esophagogastroduodenoscopy.



ALLERGIES:  The patient has no known drug allergies.



MEDICATIONS:  Although the patient is on large amount of medications, she is

only practically on Catapres TTS patch to control her blood pressure, refusing

to take any of her medication and she is also on Lovenox 1 mg/kg twice a day for

the treatment for her pulmonary emboli.  She is also on amlodipine besylate 5 mg

once a day, atorvastatin calcium 40 mg at bedtime, Cymbalta 30 mg once a day,

diphenhydramine 50 mg every 6 hours.  She is on Colace 100 mg twice a day,

ferrous sulfate 325 mg twice a day.  She is on polyethylene glycol 17 grams

daily, lactobacillus acidophilus 1 capsule twice a day.  She is on Lidoderm

patch 1 patch topically on for 12 hours and off for 12 hours, losartan potassium

50 mg once a day.  She is on Lovenox 130 mg subcutaneously twice a day.  She is

on NovoLog insulin as insulin sliding scale, nystatin powder applied topically

to the skin folds 4 times a day, ondansetron 4 mg every 6 hours, Senna-S 1

tablet twice a day, and Tylenol 650 mg every 4 hours.



PHYSICAL EXAMINATION:

GENERAL:  On arrival to the Emergency Room, the patient is nonverbal, pale, but

no jaundice or cyanosis.  No lymphadenopathy, no thyromegaly.  No jugular venous

distension.  No lower limb edema.

VITAL SIGNS:  Her heart rate was 104, blood pressure was 110/99, temperature was

98.7, respiratory rate was 18, and oxygen saturation was 96% on room air.

HEAD, EYES, EARS, NOSE AND THROAT:  Showed normocephalic, atraumatic.

NECK:  Supple.

HEART:  Showed normal first and second heart sounds with no gallop, rub or

murmur.

CHEST:  Clear to auscultation.  No crepitation or rhonchi.

ABDOMEN:  Distended, soft, nontender with some bruises on the right lower

quadrant.  She has also bruises in her right shoulder.

NEUROLOGIC:  She opens eyes, tracks, but does not respond verbally, although all

her cranial nerves seem to be grossly intact.

EXTREMITIES:  She moves upper extremities to much good extent than lower

extremities.  She is mostly bedbound, chair bound.  She has a large bruise on

her right shoulder and also left gluteal area.



LABORATORY DATA:  On arrival showed white cell count 26,500, hemoglobin 7.8,

hematocrit 23, MCV 82 and platelet count of 404,000.  Her chemistry showed serum

sodium of 127, potassium 3.7, chloride 90, bicarbonate 25, anion gap of 12, BUN

39, creatinine 1, estimated GFR was 59 mL per minute.  Her glucose 176, calcium

was 8.4, magnesium was 1.9.  Total bilirubin 1.8.  AST, ALT were normal. 

Alkaline phosphatase was 144.  Total protein was 6.4, albumin was 2.9.  TSH was

1.614.  Her urinalysis showed the urine was yellow, hazy with a pH of 5.5,

specific gravity of 1.015.  There was a trace of protein.  The urine was

negative for glucose, ketones, blood, nitrite and leukocyte esterase.  There are

only 3-5 rbc's, 1-4 wbc's, and no bacteria.



IMAGING STUDIES:  She had extensive imaging studies including CT scan of the

pelvis, which showed that there is no pelvic fracture seen.  Both hips are

intact.  Hematoma is seen within the subcutaneous fat in the left gluteal

region, which measures 16 x 14 x 8 cm in transverse craniocaudal and

anteroposterior dimension.  Calcifications are seen within the pelvis consistent

with phlebolith.  Surgical clips are seen within the lower abdomen and pelvis. 

No free fluid is seen.  Small soft tissue nodules are seen within the

subcutaneous fat of the anterior abdominal wall within the lower abdomen and

pelvis, which measures 1-3.5 cm in size.  This could represent injection

granulomas or conceivably hematomas.  She did have a CT scan of the head and

cervical spine, which showed that there is mild bilateral periventricular white

matter hypodensities, likely chronic small vessel ischemic disease, unchanged

hypodensities identified in the bilateral basal ganglia, likely old lacunar

infarct.  She has no evidence of acute intracranial hemorrhage, no extraaxial

fluid collection, no mass effect or midline shift.  Ventricular size is

appropriate.  Basal cisterns are patent.  No fracture identified.  The gray

white matter differentiation is preserved.  Globes and orbits are within normal

limits.  Paranasal sinuses and mastoid air cells are clear.  The CT scan of the

cervical spine showed no acute fracture of the cervical spine, mild degenerative

changes seen throughout the cervical spine.  She did have a chest x-ray, which

basically showed that the patient is rotated limiting assessment.  Heart size

and pulmonary vasculature are normal.  No infiltrate, pneumothorax or pleural

effusion.  Bony structures are unremarkable.  Sparing of the left humeral head

noted.  We did actually another CT scan of the chest without contrast that

showed that the heart is normal in size.  No pericardial or pleural effusion. 

Clear neck base.  No enlarged axillary or mediastinal adenopathy.  Evaluation of

the hilar lymphadenopathy is limited due to lack of IV contrast.  Central

airways are patent.  Breathing motion artifact is seen in the lung bases

limiting optimal evaluation.  No focal consolidation or pneumothorax; mild

scarring, subsegmental atelectasis in the right lung base.  The noncontrast

section through the visualized liver, spleen, adrenals are within normal limit. 

The patient has small sliding hiatal hernia, no suspicious bony lesion.  The

patient was given a loading dose of Keppra and was admitted and started on IV

antibiotic in the form of vancomycin and Zosyn, although the site of infection

is not very clear.



PLAN:  I have met her sister-in-law for the first time and apparently she has

only half brother rest of the life and he is the DPOA.  I had a lengthy

discussion with the family and that the patient basically is starving herself to

death.  We are trying to treat her with all means available, but they are

counterproductive.  She is now bleeding and her hemoglobin and hematocrit have

dropped down to 7.8 and 23.  She has marked leukocytosis.  The site of infection

is not very clear to me.  My plan is to transfuse her.  Continue with IV

antibiotic.  I have continued with Keppra for now.  My inclination is to

consider hospice as she really is not verbal.  Has not eaten or drank for a long

time and her half brother is DPOA.  We will start the process with signing the

DNR/DNI and perhaps should consider transfer her back to [place] on hospice

care.





______________________________

YUNIER MONTEMAYOR MD



DR:  ALMAZ/melisa  JOB#:  [id number] / [id number]

DD:  12/22/2018 12:34  DT:  12/22/2018 14:14

## 2018-12-22 NOTE — RAD
Ultrasound of the right upper quadrant of the abdomen  12/22/2018

 

CLINICAL HISTORY: Elevated liver function tests. Fever.

 

TECHNIQUE: A real-time ultrasound examination of the right upper quadrant 

of the abdomen was performed. Multiple images were obtained.

 

FINDINGS: Comparison is made to an ultrasound of the abdomen dated 

9/17/2018.

 

The gallbladder is well-distended. Echogenic sludge is seen within the 

gallbladder. No gallstones are visualized. The gallbladder wall thickness 

is within normal limits. No pericholecystic fluid is seen. The common bile

duct measures 4 mm in diameter which is within normal limits.

 

The liver is mildly enlarged measuring 20 cm in length. Increased 

echogenicity of the liver parenchyma is seen consistent with fatty 

infiltration. The visualized portions of pancreas and right kidney are 

within normal limits. Sludge is seen within the gallbladder. Mild 

hepatomegaly with fatty infiltration of the liver. No additional 

abnormality is seen.

 

Electronically signed by: Bravo Bond MD (12/22/2018 4:24 PM) [facility] [place]

## 2018-12-22 NOTE — RAD
PQRS Compliance statement:

 

One or more of the following individualized dose reduction techniques were

utilized for this examination:

1. Automated exposure control.

2. Adjustment of the mA and/or kV according to patient size.

3. Use of iterative reconstruction technique.

 

Indication:pneumonia, cough

 

TECHNIQUE: CT chest without IV contrast with multiplanar reformats.

 

COMPARISON: CT angiogram chest from 9/16/2018

 

FINDINGS:

Heart is normal in size. No pericardial or pleural effusion. Clear neck 

base. No enlarged axillary, mediastinal adenopathy. Evaluation of hilar 

lymphadenopathy is limited due to lack of IV contrast. Central airways are

patent. Breathing motion artifact is seen in the lung bases limiting 

optimal evaluation. No focal consolidation. No pneumothorax. Mild scarring

or subsegmental atelectasis in the right lung base. The noncontrast 

sections through the visualized liver, spleen, adrenals within normal 

limits. Small sliding hiatal hernia. No suspicious bony lesion.

 

IMPRESSION:

No evidence of pneumonia.

 

Electronically signed by: Vadim Alvarado DO (12/22/2018 9:16 AM) [facility]

## 2018-12-22 NOTE — RAD
Examination: CHEST AP ONLY

 

History: High wbc. Patient limited w/communication. Unable to follow 

breathing instructions<BR>

 

Comparison/Correlation: 9/14/2018 portable chest x-ray exam

 

Findings: Semiupright portable frontal view chest was obtained. Patient is

rotated limiting assessment. Heart size and pulmonary vasculature are 

normal. No infiltrate, pneumothorax, or pleural effusion. Bony structures 

are unremarkable. Spurring of the left humeral head noted.

 

 

Impression:

No active disease.

 

Electronically signed by: Jung Noble MD (12/21/2018 11:58 PM) [facility]

## 2018-12-22 NOTE — CONS
DATE OF CONSULTATION:  



NEUROLOGY CONSULTATION



REFERRING PHYSICIAN:  Oswald Alejo MD



REASON FOR CONSULTATION:  Mental status changes and possible seizure.



HISTORY OF PRESENT ILLNESS:  This is a 48-year-old right-handed  female

who was admitted to Emergency Room after she presented with chief complaints of

acute mental status changes, and possible recurrent seizure-like activities. 

According to her niece, the patient had a stroke in 09/2018 resulted in weakness

in the lower extremities required extensive physical therapy.  She has had

intermittent spells described as a sudden onset of staring spells, intermixed

with twitching of the left pupil lasted approximately 2 minutes.  The patient

also had intermittent spells described as a sudden onset of "blackouts" and

tendency to fall.  Currently, the patient is not able to communicate and provide

any informations.  According to her niece, the patient was able to text her back

and forth 3 days ago.  She has been in bed for the last 2 weeks because of

generalized weakness.  Prior to that, the patient was able to ambulate using a

walker and help as well.  The patient has had longstanding history of

depressions and dysphagia.  However, she did her extensive workup for dysphagia

including EGD and evaluation by a gastroenterologist without definite etiology

for her dysphagia.  The patient refused to eat fluid and she wanted to leave the

nursing home.  The patient has been seen in the recent past by a psychiatrist

for severe depressions and wishing to die.



PAST MEDICAL HISTORY:  Quite extensive including morbid obesity, severe

depressions, diabetes mellitus, urinary tract infections, and chronic dysphagia

of unknown etiology, history of stroke in 09/2018, hyperlipidemia, hypertension,

obstructive sleep apnea, required oxygen supplement, GERD, urinary incontinence.



PAST SURGICAL HISTORY:  Negative.



SOCIAL HISTORY:  The patient is a nursing home resident.  There is no history of

smoking, alcohol drinking, or illicit drug use.



CURRENT HOME MEDICATIONS:  Include Tylenol, amlodipine 5 mg p.o. daily, Lipitor

40 mg p.o. daily, clonidine, Catapres-TTS 1 every week, Benadryl 25 mg 2 capsule

p.r.n. daily, duloxetine, Cymbalta 30 mg daily, ferrous sulfate 325 mg p.o.

daily for anemia, insulin Humalog, Losartan 50 mg p.o. daily.



ALLERGIES:  No known drug allergies.



REVIEW OF SYSTEMS:  A 10-point review of system was performed as mentioned above

in history of present illness, otherwise the patient is not communicating.



PHYSICAL EXAMINATION:

GENERAL:  Obese female, not in acute distress.  She weighs 237 pounds.

VITAL SIGNS:  Blood pressure 109/52, respiratory rate 16, pulse is 89 regular,

temperature 98.4, oxygen saturation 95% on room air.

HEENT:  Normocephalic, atraumatic, otherwise unremarkable.

NECK:  Supple.  Negative for carotid bruit, lymphadenopathy, or thyromegaly.

LUNGS:  Clear to A and P.

CARDIOVASCULAR:  Regular rate and rhythm.  Normal S1, S2.

ABDOMEN:  Soft.  Bowel sounds positive.

EXTREMITIES:  Negative for cyanosis or clubbing.

NEUROLOGICAL EXAM:  Mental Status:  The patient is awake, but does not

communicate.  She follows 1-step commands.  She moves her upper extremities, but

she could not move her lower extremities.  The pupils are equal and reactive to

light and accommodation.  The extraocular movements are intact.  There is no

nystagmus.  There is no facial motor or sensory deficit.  Hearing appeared to be

intact.  Further evaluation is very limited at this time.  Sensory examination

revealed normal pinprick and light touch senses.  Deep tendon reflexes were

symmetric and hypoactive with absent Achilles responses.  Gait not tested.



DIAGNOSTIC DATA:  Chest CT angio revealed no evidence of pneumonia or edema or

pulmonary embolism.  Nonenhanced head CT scan revealed no acute intracranial

findings, but shows chronic bilateral basal ganglia infarcts.  CT of the

cervical spine revealed no acute fracture, but shows mild degenerative change of

the cervical spine.  Pelvic CT revealed no pelvic bone fracture, but showed 16

cm hematomas in the left gluteal region.  A chest x-ray revealed no acute

cardiopulmonary process.



LABORATORY DATA:  CBC revealed white blood cells of 21.3 thousand, hemoglobin

6.8, hematocrit 20.4, platelet count 355,000.  Chemistry revealed sodium of 130,

potassium 3.8, chloride 94, CO2 25, BUN 38, creatinine 1, glucose is 147,

calcium 7.8.  Troponin level is normal.  TSH is normal.  Urinalysis is negative

for urinary tract infections.



IMPRESSION:

1.  Acute encephalopathy, probably due to systemic infections and metabolic

derangements.

2.  Renal insufficiency.

3.  Severe anemia, required blood transfusion.

4.  Leukocytosis, probably due to underlying systemic infections.

5.  Multiple medical problems include severe depressions, morbid obesity,

obstructive sleep apnea, hypertension, hyperlipidemia, obstructive sleep apnea.

6.  History of recent stroke.



RECOMMENDATIONS:

1.  The patient was started on Keppra for possible underlying intermittent

seizure.

2.  Continue with current management initiated by Dr. Alejo for underlying

medical problems.

3.  Consider other alternative options for feeding as a feeding tube insertion.





______________________________

M YUSUF LEON MD



DR:  SHARMIN/melisa  JOB#:  [id number] / [id number]

DD:  12/22/2018 13:33  DT:  12/22/2018 22:32

## 2018-12-23 VITALS — DIASTOLIC BLOOD PRESSURE: 60 MMHG | SYSTOLIC BLOOD PRESSURE: 131 MMHG

## 2018-12-23 VITALS — DIASTOLIC BLOOD PRESSURE: 60 MMHG | SYSTOLIC BLOOD PRESSURE: 98 MMHG

## 2018-12-23 VITALS — DIASTOLIC BLOOD PRESSURE: 64 MMHG | SYSTOLIC BLOOD PRESSURE: 122 MMHG

## 2018-12-23 VITALS — DIASTOLIC BLOOD PRESSURE: 43 MMHG | SYSTOLIC BLOOD PRESSURE: 107 MMHG

## 2018-12-23 VITALS — SYSTOLIC BLOOD PRESSURE: 143 MMHG | DIASTOLIC BLOOD PRESSURE: 89 MMHG

## 2018-12-23 VITALS — SYSTOLIC BLOOD PRESSURE: 107 MMHG | DIASTOLIC BLOOD PRESSURE: 43 MMHG

## 2018-12-23 VITALS — SYSTOLIC BLOOD PRESSURE: 135 MMHG | DIASTOLIC BLOOD PRESSURE: 60 MMHG

## 2018-12-23 VITALS — SYSTOLIC BLOOD PRESSURE: 133 MMHG | DIASTOLIC BLOOD PRESSURE: 78 MMHG

## 2018-12-23 VITALS — SYSTOLIC BLOOD PRESSURE: 90 MMHG | DIASTOLIC BLOOD PRESSURE: 43 MMHG

## 2018-12-23 VITALS — SYSTOLIC BLOOD PRESSURE: 121 MMHG | DIASTOLIC BLOOD PRESSURE: 68 MMHG

## 2018-12-23 VITALS — SYSTOLIC BLOOD PRESSURE: 95 MMHG | DIASTOLIC BLOOD PRESSURE: 51 MMHG

## 2018-12-23 VITALS — DIASTOLIC BLOOD PRESSURE: 43 MMHG | SYSTOLIC BLOOD PRESSURE: 93 MMHG

## 2018-12-23 LAB
ALBUMIN SERPL-MCNC: 2.1 G/DL (ref 3.4–5)
ALBUMIN/GLOB SERPL: 0.7 {RATIO} (ref 1–1.7)
ALP SERPL-CCNC: 162 U/L (ref 46–116)
ALT SERPL-CCNC: 19 U/L (ref 14–59)
ANION GAP SERPL CALC-SCNC: 9 MMOL/L (ref 6–14)
AST SERPL-CCNC: 17 U/L (ref 15–37)
BASOPHILS # BLD AUTO: 0.1 X10^3/UL (ref 0–0.2)
BASOPHILS NFR BLD: 1 % (ref 0–3)
BILIRUB SERPL-MCNC: 1.5 MG/DL (ref 0.2–1)
BUN/CREAT SERPL: 33 (ref 6–20)
CA-I SERPL ISE-MCNC: 26 MG/DL (ref 7–20)
CALCIUM SERPL-MCNC: 7.3 MG/DL (ref 8.5–10.1)
CHLORIDE SERPL-SCNC: 104 MMOL/L (ref 98–107)
CO2 SERPL-SCNC: 24 MMOL/L (ref 21–32)
CREAT SERPL-MCNC: 0.8 MG/DL (ref 0.6–1)
EOSINOPHIL NFR BLD: 0.1 X10^3/UL (ref 0–0.7)
EOSINOPHIL NFR BLD: 1 % (ref 0–3)
ERYTHROCYTE [DISTWIDTH] IN BLOOD BY AUTOMATED COUNT: 17.5 % (ref 11.5–14.5)
GFR SERPLBLD BASED ON 1.73 SQ M-ARVRAT: 76.6 ML/MIN
GLOBULIN SER-MCNC: 3.1 G/DL (ref 2.2–3.8)
GLUCOSE SERPL-MCNC: 100 MG/DL (ref 70–99)
HCT VFR BLD CALC: 20.8 % (ref 36–47)
HGB BLD-MCNC: 6.8 G/DL (ref 12–15.5)
LYMPHOCYTES # BLD: 2.4 X10^3/UL (ref 1–4.8)
LYMPHOCYTES NFR BLD AUTO: 21 % (ref 24–48)
MCH RBC QN AUTO: 28 PG (ref 25–35)
MCHC RBC AUTO-ENTMCNC: 33 G/DL (ref 31–37)
MCV RBC AUTO: 84 FL (ref 79–100)
MONO #: 0.8 X10^3/UL (ref 0–1.1)
MONOCYTES NFR BLD: 7 % (ref 0–9)
NEUT #: 8.3 X10^3UL (ref 1.8–7.7)
NEUTROPHILS NFR BLD AUTO: 71 % (ref 31–73)
PLATELET # BLD AUTO: 254 X10^3/UL (ref 140–400)
POTASSIUM SERPL-SCNC: 3.4 MMOL/L (ref 3.5–5.1)
PROT SERPL-MCNC: 5.2 G/DL (ref 6.4–8.2)
RBC # BLD AUTO: 2.47 X10^6/UL (ref 3.5–5.4)
SODIUM SERPL-SCNC: 137 MMOL/L (ref 136–145)
VANC TR: 33 MCG/ML (ref 10–20)
WBC # BLD AUTO: 11.7 X10^3/UL (ref 4–11)

## 2018-12-23 RX ADMIN — PIPERACILLIN SODIUM AND TAZOBACTAM SODIUM SCH MLS/HR: 3; .375 INJECTION, POWDER, LYOPHILIZED, FOR SOLUTION INTRAVENOUS at 05:39

## 2018-12-23 RX ADMIN — SODIUM CHLORIDE SCH MLS/HR: 0.9 INJECTION, SOLUTION INTRAVENOUS at 01:30

## 2018-12-23 RX ADMIN — VANCOMYCIN HYDROCHLORIDE PRN EACH: 1 INJECTION, POWDER, LYOPHILIZED, FOR SOLUTION INTRAVENOUS at 08:32

## 2018-12-23 RX ADMIN — SODIUM CHLORIDE AND POTASSIUM CHLORIDE SCH MLS/HR: 9; 2.98 INJECTION, SOLUTION INTRAVENOUS at 22:48

## 2018-12-23 RX ADMIN — PIPERACILLIN SODIUM AND TAZOBACTAM SODIUM SCH MLS/HR: 3; .375 INJECTION, POWDER, LYOPHILIZED, FOR SOLUTION INTRAVENOUS at 18:00

## 2018-12-23 RX ADMIN — PIPERACILLIN SODIUM AND TAZOBACTAM SODIUM SCH MLS/HR: 3; .375 INJECTION, POWDER, LYOPHILIZED, FOR SOLUTION INTRAVENOUS at 12:10

## 2018-12-23 RX ADMIN — SODIUM CHLORIDE AND POTASSIUM CHLORIDE SCH MLS/HR: 9; 2.98 INJECTION, SOLUTION INTRAVENOUS at 08:31

## 2018-12-23 RX ADMIN — PIPERACILLIN SODIUM AND TAZOBACTAM SODIUM SCH MLS/HR: 3; .375 INJECTION, POWDER, LYOPHILIZED, FOR SOLUTION INTRAVENOUS at 00:30

## 2018-12-23 RX ADMIN — VANCOMYCIN HYDROCHLORIDE SCH MLS/HR: 1 INJECTION, POWDER, LYOPHILIZED, FOR SOLUTION INTRAVENOUS at 08:17

## 2018-12-23 RX ADMIN — Medication SCH CAP: at 09:00

## 2018-12-23 RX ADMIN — Medication SCH CAP: at 21:00

## 2018-12-23 NOTE — PN
DATE:  12/23/2018



SUBJECTIVE:  The patient is resting flat, comfortably in bed, in no apparent

respiratory distress.  Her H and H has dropped down to 6.8 and 20, and we did

type and cross and transfuse her 1 unit of blood.  She continued to have marked

leukocytosis, although her white cell count is coming down from 26,000-21,300. 

CT scan was unremarkable.  Urinalysis was unremarkable.  She has liver enzymes,

especially her bilirubin as well as alkaline phosphatase was high, raising the

possibility of obstructive jaundice or acute cholecystitis and therefore we

ordered an abdominal ultrasound.  Her sodium has improved from 127-130.



PHYSICAL EXAMINATION:

GENERAL:  When I saw her, she looked well and was clearly in no apparent

respiratory distress, pale, but no jaundice or cyanosis.  No lymphadenopathy, no

thyromegaly.  No jugular venous distension.  No lower limb edema.

VITAL SIGNS:  Her heart rate was 86, blood pressure was 82/52, temperature was

98.6, respiratory rate 20, and oxygen saturation was 95% on room air.

HEAD, EYES, EARS, NOSE AND THROAT:  Normocephalic, atraumatic.

NECK:  Supple.

HEART:  Showed normal first and second heart sounds with no gallop, rub or

murmur.

CHEST:  Clear to auscultation.  No crepitation or rhonchi.

ABDOMEN:  Distended, soft, nontender.

NEUROLOGIC:  She is awake, alert, does not really tracks with her eyes, but does

not respond verbally.  She refuses to talk, eat, or drink; however, all her

cranial nerves are intact.

EXTREMITIES:  She moves upper extremities to much good extent.  Lower

extremities, she has multiple bruises over her right shoulder, left hip, and

right lower quadrant over the site of injection of Lovenox.



Her intake over the last 24 hours was 400, no output was recorded.



LABORATORY DATA:  Showed that her serum sodium is up to 130, potassium 3.8,

chloride 94, bicarbonate 25, anion gap of 11, BUN 38, creatinine 1, estimated

GFR was 59 mL per minute.  Her glucose 147, calcium was 7.8, lactic acid was

1.7.  Her white cell count is slightly down to 21,300, hemoglobin; however,

dropped down to 6.8, hematocrit 20.4, MCV 82 and platelet count of 355,000 with

normal manual differential.  Her urinalysis was unremarkable.



ASSESSMENT:

1. Recurrent seizures.  By description of the nursing staff at [facility] for

which we started her on Keppra.

2. Acute blood loss anemia with a large hematoma in the left gluteal area, for

which she was typed and crossed.  I will transfuse her 1 unit of blood with no

obvious source of infection.  Her chest x-ray and urinalysis is unremarkable. 

She is; however, on IV antibiotic.  Her bilirubin and alkaline phosphatase were

high, so we ordered abdominal ultrasound.



OTHER MEDICAL PROBLEMS:  Include type 2 diabetes mellitus, hypertension, morbid

obesity, obstructive sleep apnea.  She has multiple CVAs and bilateral pulmonary

emboli for which she used to be on Lovenox.





______________________________

YUNIER MONTEMAYOR MD



DR:  ALMAZ/melisa  JOB#:  [id number] / [id number]

DD:  12/23/2018 10:31  DT:  12/23/2018 14:25

## 2018-12-23 NOTE — PN
DATE:  



SUBJECTIVE:  The patient has been sleeping, not responds to verbal commands. 

She was awake throughout the night.  She moves her lower extremity slightly.



OBJECTIVE:

GENERAL:  Morbidly obese female, not in acute distress.

VITAL SIGNS:  Blood pressure 131/60, respiratory rate 20, pulse is 76,

temperature 97.7, oxygen saturation 98% on room air.

HEENT:  Normocephalic and atraumatic.

NECK:  Supple.  Negative for carotid bruit, lymphadenopathy or thyromegaly.

LUNGS:  Clear to A and P.

CARDIOVASCULAR:  Regular rhythm, normal S1, S2.

ABDOMEN:  Soft.  Bowel sounds are positive.

EXTREMITIES:  Negative for cyanosis, clubbing or pitting edema.

NEUROLOGIC:  Mental status:  The patient is sleepy and difficult to arouse. 

Cranial nerves, no facial asymmetry.  Motor:  The patient has a flaccid upper

and lower extremities.  Sensory decreased pinprick and light touch senses.  Deep

tendon reflexes were hypoactive with absent Achilles responses.  Gait not

tested.



LABORATORY DATA:  CBC revealed white blood cells of 11.7 thousand, hemoglobin

6.8, hematocrit 20.8 and platelet count 254,000.  Chemistry:  Sodium 137,

potassium 3.4, chloride 104, CO2 24, BUN 26, creatinine 0.8, glucose 100 and

calcium 7.3.



IMPRESSION:

1.  Acute encephalopathy, metabolic versus infectious.

2.  Leukocytosis -- improved on antibiotics.

3.  Dysphagia.  The patient has not been drinking or eating for several days.

4.  Multiple medical problems include severe anemia, required blood transfusion

this morning, renal insufficiency, hypertension, hyperlipidemia, obstructive

sleep apnea, depressions, and anxiety.



RECOMMENDATIONS:

1.  Continue with current management initiated by Dr. Alejo.

2.  As the patient refused a feeding tube insertion, then the patient will be

discharged soon for a question under hospice care.  The patient will be DNR/DNI.





______________________________

M YUSUF LEON MD



DR:  SHARMIN/melisa  JOB#:  [id number] / [id number]

DD:  12/23/2018 14:02  DT:  12/23/2018 18:33

## 2018-12-23 NOTE — PN
DATE:  12/23/2018



SUBJECTIVE:  The patient is resting, slightly propped up in bed, no apparent

distress.  continued to be nonverbal.  She unfortunately dropped her H and H

again this morning with hemoglobin down to 6.8, hematocrit 20.8.  She has had

her ultrasound, which showed that the gallbladder is well distended; however,

echogenic sludge seen within the gallbladder.  No gallstones are visualized. 

The gallbladder wall thickness is within normal limits.  No pericholecystic

fluid seen.  The common bile duct measures only 4 mm in diameter, which is well

within normal limits.



OBJECTIVE:

GENERAL:  On examining her, she was pale, but no jaundice, cyanosis, or

thyromegaly.  No jugular venous distention.  No limb edema.

VITAL SIGNS:  Her heart rate was 66, blood pressure 107/43, temperature was

98.5, respiratory rate 20, and oxygen saturation was 98% on room air.

HEAD, EYES, EARS, NOSE AND THROAT:  Showed normocephalic, atraumatic.

NECK:  Supple.

HEART:  Normal first and second heart sounds.  No gallop, rub or murmur.

CHEST:  Clear to auscultation.  No crepitation or rhonchi.

ABDOMEN:  Distended, soft, nontender.

NEUROLOGIC:  She continued to be nonverbal, although she is awake, alert, opens

eyes, tracks, but does not respond verbally.  She is able to move her upper

extremities to much good extent than lower extremities.  She is mostly bedbound,

chair bound.  She has multiple bruises on her right shoulder, right lower

quadrant, and left gluteal area with large hematoma.



Her intake over the last 24 hours was 400, no output was recorded.



LABORATORY DATA:  As of this morning, her serum sodium is up to 137, potassium

3.4, chloride 104, bicarbonate 24, anion gap of 9, BUN 26, creatinine 0.8,

estimated GFR was 76 mL per minute.  Her glucose was 100, calcium was 7.3. 

Total bilirubin is 1.5.  AST, ALT were normal.  Alkaline phosphatase still high.

 Total protein was 5.2, albumin was 2.1.  Her white cell count is down to

11,700, hemoglobin 6.8, hematocrit 20.8, MCV 84 and platelet count of 254,000. 

Her vancomycin trough level was extremely high at 33.



ASSESSMENT:

1. Seizures as described by the nursing staff at the nursing home for which we

started her on Keppra.

2. Acute encephalopathy, multifactorial.

3. Acute kidney injury, improving.  Her BUN and creatinine are improving.  Her

creatinine is down from 1-0.8 and creatinine is down from 39-26.

4. Hyponatremia, improving.  Her serum sodium has risen from 127-137.

5. Marked leukocytosis, resolving.  Her white cell count is coming down from

26.5-11.7.

6. Acute blood loss anemia with a large hematoma in the left gluteal area for

which she received 1 unit of packed RBCs yesterday and she is currently

receiving another unit.

7. Type 2 diabetes.

8. Multiple cerebrovascular accidents.

9. Hypertension.

10. Morbid obesity, obstructive sleep apnea.

11. Dysphagia.  The patient is refusing to eat and drink and I had a lengthy

discussion yesterday with her half-brother and his wife.  She is now DNR/DNI and

we will plan to discharge her back to [facility] in [place] tomorrow

on hospice care.





______________________________

YUNIER MONTEMAYOR MD



DR:  ALMAZ/melisa  JOB#:  [id number] / [id number]

DD:  12/23/2018 10:39  DT:  12/23/2018 14:43

## 2018-12-24 VITALS — DIASTOLIC BLOOD PRESSURE: 73 MMHG | SYSTOLIC BLOOD PRESSURE: 116 MMHG

## 2018-12-24 VITALS — SYSTOLIC BLOOD PRESSURE: 144 MMHG | DIASTOLIC BLOOD PRESSURE: 89 MMHG

## 2018-12-24 RX ADMIN — PIPERACILLIN SODIUM AND TAZOBACTAM SODIUM SCH MLS/HR: 3; .375 INJECTION, POWDER, LYOPHILIZED, FOR SOLUTION INTRAVENOUS at 00:10

## 2018-12-24 RX ADMIN — SODIUM CHLORIDE AND POTASSIUM CHLORIDE SCH MLS/HR: 9; 2.98 INJECTION, SOLUTION INTRAVENOUS at 04:15

## 2018-12-24 RX ADMIN — PIPERACILLIN SODIUM AND TAZOBACTAM SODIUM SCH MLS/HR: 3; .375 INJECTION, POWDER, LYOPHILIZED, FOR SOLUTION INTRAVENOUS at 12:00

## 2018-12-24 RX ADMIN — PIPERACILLIN SODIUM AND TAZOBACTAM SODIUM SCH MLS/HR: 3; .375 INJECTION, POWDER, LYOPHILIZED, FOR SOLUTION INTRAVENOUS at 06:07

## 2018-12-24 RX ADMIN — Medication SCH CAP: at 08:16

## 2018-12-24 NOTE — DS
DATE OF DISCHARGE:  12/24/2018



DISCHARGE TRANSFER SUMMARY



HOSPITAL COURSE:  The patient is a 48-year-old  female patient with

multiple medical problems who was resident at [facility] in [place]. 

She was noted by nursing staff there to have what seemed to be seizure disorder.

 She was brought to the Emergency Room, evaluated extensively.  She was found to

have a large hematoma in her left gluteal area.  She has hyponatremia and acute

kidney injury.  We did start her on IV antibiotics as her white cell count was

extremely high at 26,500 and she dropped her hemoglobin and hematocrit down to

6.5 and 20.  She received 1 unit of packed RBCs and we did start her also on

Keppra.  She has received the loading dose of 1000 mg at the Emergency Room and

continued 500 mg IV twice a day.  Throughout her stay here, she remained

nonverbal.  She refused to eat and drink.  After lengthy discussion with her

family and a decision was made to change her code status to DNR/DNI and given

that she has been refusing to eat and drink all this time now.  A decision was

made to discharge her home back to [place] on hospice care.



PHYSICAL EXAMINATION:

GENERAL:  On examining her today, she looked pale, but no jaundice, cyanosis, or

thyromegaly.  No jugular venous distention.  No limb edema.

VITAL SIGNS:  Her heart rate was 62, blood pressure 144/89, temperature was

98.2, respiratory rate 20, and oxygen saturation was 92% on 2 liters of oxygen

by nasal cannula.

HEAD, EYES, EARS, NOSE AND THROAT:  Showed normocephalic, atraumatic.

NECK:  Supple.

HEART:  Showed normal first and second heart sounds.  No gallop, rub or murmur.

CHEST:  Clear to auscultation.  No crepitation or rhonchi.

ABDOMEN:  Markedly distended.  Some tender bruises on the right lower quadrant.

NEUROLOGIC:  She is awake, alert, opens her eyes, but does not really interact

nor does she respond verbally.  She refused to eat and drink.  All her cranial

nerves seem to be grossly intact.  She moves upper extremities to much good

extent than lower extremities.  She has bruises on the outer aspect of the right

shoulder.  She has a large bruise and hematoma in the left gluteal area.  She

has an indwelling Hernandes catheter.



LABORATORY DATA:  Showed that her white cell count is down to 11,700, she

received actually 1 unit of packed RBCs and hemoglobin was 7.4, hematocrit 20,

MCV 84 and platelet count 254,000.  She will be discharged.  Her most chemistry

showed serum sodium 137, potassium 3.4, chloride 104, bicarbonate 24, anion gap

of 9, BUN 26, creatinine 0.8, estimated GFR was 76 mL per minute.  Her glucose

was 100, calcium was 7.3.  Total bilirubin, AST were normal.  Alkaline

phosphatase slightly elevated.  We did actually see an abdominal ultrasound,

showed no evidence of acute cholecystitis with no wall thickening and no

pericholecystic fluid.  Common bile duct was normal.



DISCHARGE MEDICATIONS:  She will be discharged back [place] to continue on

Keppra 1000 mg twice a day, Roxanol 20 mg per mL 0.25-1 mL p.o. sublingually

every 2 hours, Ativan 0.25-1 mL that is 0.5 to 2 mg sublingually orally every 2

hours, scopolamine patch 1.5 mg topically q.72 hours.



FINAL DISCHARGE DIAGNOSES:  Severe encephalopathy, multifactorial including

dysphagia.  The patient has been refusing to eat and drink for months.  She has

had multiple swallowing evaluation.  She has also esophagogastroduodenoscopy and

there is no reason for her not to be able to eat and drink.  Other medical

problems include acute on chronic blood loss anemia; acute kidney injury,

resolving; hyponatremia, improved; hypertension; hyperlipidemia; morbid obesity;

obstructive sleep and severe depression and anxiety.





______________________________

YUNIER MONTEMAYOR MD



DR:  ALMAZ/melisa  JOB#:  [id number] / [id number]

DD:  12/24/2018 13:28  DT:  12/24/2018 14:37

## 2018-12-24 NOTE — PN
DATE:  12/24/2018



SUBJECTIVE:  The patient denies headaches, chest pain, shortness of breath or

palpitations.  There are no obvious new medical or neurological complaints.



OBJECTIVE:

GENERAL:  Morbidly obese female, not in acute distress.

VITAL SIGNS:  Blood pressure 160/73, respiratory rate 16, pulse is 75,

temperature 98.9, oxygen saturation 95% on room air.

HEENT:  Normocephalic, atraumatic, otherwise unremarkable.

NECK:  Supple.  Negative for carotid bruit, lymphadenopathy or thyromegaly.

LUNGS:  Clear to A and P.

CARDIOVASCULAR:  Regular rate and rhythm, normal S1, S2.

ABDOMEN:  Soft.  Bowel sounds positive.

EXTREMITIES:  Negative for cyanosis, clubbing or pitting edema.

NEUROLOGIC:  The patient is awake and more alert.  She follows 1-step commands. 

She also questions correctly.  She knows the date and she knows she is in the

hospital.  Her speech is more talkative this morning even though she says some

words.  Further evaluation of mental status is limited at this time.

CRANIAL NERVES:  The pupils are equal and reactive to light and accommodation. 

The extraocular movements are intact.  There is no nystagmus.  There is no

facial motor or sensory deficit.  Hearing appeared to be intact.  The palate is

elevated symmetrically.  Sternocleidomastoid muscles elevated symmetrically. 

Motor examination:  The patient moves her upper extremities on command.  She

wiggles her toes on commands.  Sensory examination revealed normal pinprick and

light touch senses.  Deep tendon reflexes are symmetric and hypoactive with

absent Achilles responses.  Gait not tested.



IMPRESSION:

1. Acute encephalopathy with some improvement this morning.

2. History of dysphagia.

3. Multiple medical problems include severe anemia, required blood transfusion,

renal insufficiency, hypertension, hyperlipidemia, obstructive sleep apnea,

depressions, and anxiety.



RECOMMENDATIONS:

1. We will continue with current management initiated by Dr. Alejo.

2. The patient stated she wanted to eat and it is recommended to try to feed her

today to see if she is able to swallow.





______________________________

M YUSUF LEON MD



DR:  SHARMIN/melisa  JOB#:  [id number] / [id number]

DD:  12/24/2018 10:04  DT:  12/24/2018 10:36

## 2018-12-26 LAB — NEUT #: 19.9 X10^3UL (ref 1.8–7.7)
